# Patient Record
Sex: FEMALE | Race: WHITE | NOT HISPANIC OR LATINO | Employment: OTHER | URBAN - METROPOLITAN AREA
[De-identification: names, ages, dates, MRNs, and addresses within clinical notes are randomized per-mention and may not be internally consistent; named-entity substitution may affect disease eponyms.]

---

## 2023-03-16 PROBLEM — I65.23 BILATERAL CAROTID ARTERY STENOSIS: Status: ACTIVE | Noted: 2023-03-16

## 2023-03-16 PROBLEM — S42.209D: Status: ACTIVE | Noted: 2023-03-16

## 2023-03-16 PROBLEM — Z85.72 HISTORY OF LYMPHOMA: Status: ACTIVE | Noted: 2023-03-16

## 2023-03-16 PROBLEM — Z85.3 HISTORY OF BREAST CANCER: Status: ACTIVE | Noted: 2023-03-16

## 2023-03-16 PROBLEM — Z85.850 HISTORY OF THYROID CANCER: Status: ACTIVE | Noted: 2023-03-16
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
## 2023-03-16 PROBLEM — F32.1 DEPRESSION, MAJOR, SINGLE EPISODE, MODERATE (MULTI): Status: ACTIVE | Noted: 2023-03-16

## 2023-03-16 PROBLEM — I73.9 PERIPHERAL ARTERIAL DISEASE (CMS-HCC): Status: ACTIVE | Noted: 2023-03-16

## 2023-03-16 PROBLEM — K92.2 GI BLEEDING: Status: ACTIVE | Noted: 2023-03-16

## 2023-03-16 PROBLEM — N18.30 CKD (CHRONIC KIDNEY DISEASE) STAGE 3, GFR 30-59 ML/MIN (MULTI): Status: ACTIVE | Noted: 2023-03-16

## 2023-03-16 PROBLEM — C44.301 SKIN CANCER OF NOSE: Status: ACTIVE | Noted: 2023-03-16

## 2023-03-16 PROBLEM — R60.0 EDEMA LEG: Status: ACTIVE | Noted: 2023-03-16

## 2023-03-16 PROBLEM — C78.00: Status: RESOLVED | Noted: 2023-03-16 | Resolved: 2023-03-16

## 2023-03-16 PROBLEM — H61.20 CERUMEN IMPACTION: Status: ACTIVE | Noted: 2023-03-16

## 2023-03-16 PROBLEM — S09.90XA CLOSED HEAD INJURY: Status: ACTIVE | Noted: 2023-03-16

## 2023-03-16 PROBLEM — C78.00: Status: ACTIVE | Noted: 2023-03-16

## 2023-03-16 PROBLEM — C82.93: Status: RESOLVED | Noted: 2023-03-16 | Resolved: 2023-03-16

## 2023-03-16 PROBLEM — I50.32 CHRONIC DIASTOLIC HEART FAILURE (MULTI): Status: ACTIVE | Noted: 2023-03-16

## 2023-03-16 PROBLEM — R22.2 ABDOMINAL WALL MASS: Status: ACTIVE | Noted: 2023-03-16

## 2023-03-16 PROBLEM — C73 MALIGNANT TUMOR OF THYROID GLAND (MULTI): Status: RESOLVED | Noted: 2023-03-16 | Resolved: 2023-03-16

## 2023-03-16 PROBLEM — K25.9 GASTRIC ULCER: Status: ACTIVE | Noted: 2023-03-16

## 2023-03-16 PROBLEM — E11.42 POLYNEUROPATHY DUE TO TYPE 2 DIABETES MELLITUS (MULTI): Status: RESOLVED | Noted: 2023-03-16 | Resolved: 2023-03-16

## 2023-03-16 PROBLEM — L29.9 PRURITUS: Status: ACTIVE | Noted: 2023-03-16

## 2023-03-16 PROBLEM — F41.1 ANXIETY STATE: Status: ACTIVE | Noted: 2018-01-01

## 2023-03-16 PROBLEM — C82.93: Status: ACTIVE | Noted: 2023-03-16

## 2023-03-16 PROBLEM — F41.1 ANXIETY STATE: Status: RESOLVED | Noted: 2018-01-01 | Resolved: 2023-03-16

## 2023-03-16 PROBLEM — M25.612 DECREASED RANGE OF MOTION OF LEFT SHOULDER: Status: ACTIVE | Noted: 2023-03-16

## 2023-03-16 PROBLEM — C73 MALIGNANT TUMOR OF THYROID GLAND (MULTI): Status: ACTIVE | Noted: 2023-03-16

## 2023-03-16 PROBLEM — E11.42 POLYNEUROPATHY DUE TO TYPE 2 DIABETES MELLITUS (MULTI): Status: ACTIVE | Noted: 2023-03-16

## 2023-03-16 PROBLEM — K65.9 PERITONITIS (MULTI): Status: ACTIVE | Noted: 2023-03-16

## 2023-03-16 PROBLEM — U07.1 COVID-19 VIRUS INFECTION: Status: ACTIVE | Noted: 2023-03-16

## 2023-03-16 PROBLEM — I50.32 CHRONIC DIASTOLIC HEART FAILURE (MULTI): Status: RESOLVED | Noted: 2023-03-16 | Resolved: 2023-03-16

## 2023-03-16 RX ORDER — WARFARIN 4 MG/1
TABLET ORAL
COMMUNITY
Start: 2022-07-09

## 2023-03-16 RX ORDER — METOPROLOL TARTRATE 50 MG/1
1 TABLET ORAL 2 TIMES DAILY
COMMUNITY
Start: 2021-01-21

## 2023-03-16 RX ORDER — ALBUTEROL SULFATE 90 UG/1
AEROSOL, METERED RESPIRATORY (INHALATION)
COMMUNITY
Start: 2022-07-29 | End: 2023-06-27 | Stop reason: ALTCHOICE

## 2023-03-16 RX ORDER — PANTOPRAZOLE SODIUM 40 MG/1
1 TABLET, DELAYED RELEASE ORAL DAILY
COMMUNITY
Start: 2021-01-21

## 2023-03-16 RX ORDER — NIACIN (INOSITOL NIACINATE) 400(500MG)
1 CAPSULE ORAL 2 TIMES DAILY
COMMUNITY
Start: 2021-01-21 | End: 2023-06-27 | Stop reason: ALTCHOICE

## 2023-03-16 RX ORDER — ANASTROZOLE 1 MG/1
TABLET ORAL
COMMUNITY

## 2023-03-16 RX ORDER — WARFARIN 2 MG/1
TABLET ORAL
COMMUNITY

## 2023-03-16 RX ORDER — LEVOTHYROXINE SODIUM 100 UG/1
TABLET ORAL
COMMUNITY
Start: 2022-08-19

## 2023-03-16 RX ORDER — POTASSIUM CHLORIDE 1500 MG/1
1 TABLET, EXTENDED RELEASE ORAL 2 TIMES DAILY
COMMUNITY
Start: 2021-01-21

## 2023-03-16 RX ORDER — AMITRIPTYLINE HYDROCHLORIDE 10 MG/1
1 TABLET, FILM COATED ORAL NIGHTLY
COMMUNITY
Start: 2022-08-29

## 2023-03-16 RX ORDER — ACETAMINOPHEN, DEXTROMETHORPHAN HBR, DOXYLAMINE SUCCINATE, PHENYLEPHRINE HCL 650; 20; 12.5; 1 MG/30ML; MG/30ML; MG/30ML; MG/30ML
SOLUTION ORAL
COMMUNITY
Start: 2021-01-21

## 2023-03-21 ENCOUNTER — OFFICE VISIT (OUTPATIENT)
Dept: PRIMARY CARE | Facility: CLINIC | Age: 88
End: 2023-03-21
Payer: MEDICARE

## 2023-03-21 VITALS
BODY MASS INDEX: 23.92 KG/M2 | WEIGHT: 130 LBS | OXYGEN SATURATION: 94 % | DIASTOLIC BLOOD PRESSURE: 90 MMHG | HEIGHT: 62 IN | HEART RATE: 82 BPM | RESPIRATION RATE: 18 BRPM | TEMPERATURE: 98.1 F | SYSTOLIC BLOOD PRESSURE: 150 MMHG

## 2023-03-21 DIAGNOSIS — H61.21 IMPACTED CERUMEN OF RIGHT EAR: Primary | ICD-10-CM

## 2023-03-21 PROCEDURE — 99213 OFFICE O/P EST LOW 20 MIN: CPT | Performed by: INTERNAL MEDICINE

## 2023-03-21 PROCEDURE — 1159F MED LIST DOCD IN RCRD: CPT | Performed by: INTERNAL MEDICINE

## 2023-03-21 PROCEDURE — 1036F TOBACCO NON-USER: CPT | Performed by: INTERNAL MEDICINE

## 2023-03-21 RX ORDER — TELMISARTAN 80 MG/1
80 TABLET ORAL DAILY
COMMUNITY

## 2023-03-21 RX ORDER — MIRTAZAPINE 15 MG/1
15 TABLET, FILM COATED ORAL NIGHTLY
COMMUNITY

## 2023-03-21 ASSESSMENT — PAIN SCALES - GENERAL: PAINLEVEL: 0-NO PAIN

## 2023-03-21 NOTE — PROGRESS NOTES
"The patient is here for:   Chief Complaint   Patient presents with    ears cleaned        I have reviewed existing histories, notes, past medical history, surgical history, social history, family history, med list, allergy list, and immunization, and updated if applicable.    Patient's PCP is: Iain Cooley MD    HPI:   Ear doctor told her to get ears cleaned before they fit her for new hearing aid.  No pain in ears.    Additional concerns and ROS:  none    Physical exam:  /90   Pulse 82   Temp 36.7 °C (98.1 °F)   Resp 18   Ht 1.575 m (5' 2\")   Wt 59 kg (130 lb)   SpO2 94%   BMI 23.78 kg/m²    Small amount of cerumen, removed with looping.  Pt tolerated well.    Assessments/orders:   1. Impacted cerumen of right ear         Plan/discussion and follow up:      Follow up prn           "

## 2023-06-26 RX ORDER — ACETAMINOPHEN 500 MG
TABLET ORAL
COMMUNITY

## 2023-06-27 ENCOUNTER — OFFICE VISIT (OUTPATIENT)
Dept: PRIMARY CARE | Facility: CLINIC | Age: 88
End: 2023-06-27
Payer: MEDICARE

## 2023-06-27 VITALS
HEART RATE: 85 BPM | HEIGHT: 62 IN | DIASTOLIC BLOOD PRESSURE: 82 MMHG | TEMPERATURE: 97.8 F | SYSTOLIC BLOOD PRESSURE: 131 MMHG | WEIGHT: 130 LBS | BODY MASS INDEX: 23.92 KG/M2 | OXYGEN SATURATION: 91 %

## 2023-06-27 DIAGNOSIS — Z00.00 MEDICARE ANNUAL WELLNESS VISIT, SUBSEQUENT: Primary | ICD-10-CM

## 2023-06-27 DIAGNOSIS — R10.9 ABDOMINAL PAIN, UNSPECIFIED ABDOMINAL LOCATION: ICD-10-CM

## 2023-06-27 LAB
POC APPEARANCE, URINE: CLEAR
POC BILIRUBIN, URINE: NEGATIVE
POC BLOOD, URINE: NEGATIVE
POC COLOR, URINE: YELLOW
POC GLUCOSE, URINE: NEGATIVE MG/DL
POC KETONES, URINE: NEGATIVE MG/DL
POC LEUKOCYTES, URINE: NEGATIVE
POC NITRITE,URINE: NEGATIVE
POC PH, URINE: 6 PH
POC PROTEIN, URINE: NEGATIVE MG/DL
POC SPECIFIC GRAVITY, URINE: >=1.03
POC UROBILINOGEN, URINE: 0.2 EU/DL

## 2023-06-27 PROCEDURE — 1036F TOBACCO NON-USER: CPT | Performed by: INTERNAL MEDICINE

## 2023-06-27 PROCEDURE — 1170F FXNL STATUS ASSESSED: CPT | Performed by: INTERNAL MEDICINE

## 2023-06-27 PROCEDURE — 81003 URINALYSIS AUTO W/O SCOPE: CPT | Performed by: INTERNAL MEDICINE

## 2023-06-27 PROCEDURE — 1159F MED LIST DOCD IN RCRD: CPT | Performed by: INTERNAL MEDICINE

## 2023-06-27 PROCEDURE — G0439 PPPS, SUBSEQ VISIT: HCPCS | Performed by: INTERNAL MEDICINE

## 2023-06-27 PROCEDURE — 99213 OFFICE O/P EST LOW 20 MIN: CPT | Performed by: INTERNAL MEDICINE

## 2023-06-27 RX ORDER — ONDANSETRON 4 MG/1
TABLET, ORALLY DISINTEGRATING ORAL
COMMUNITY
Start: 2022-07-29

## 2023-06-27 ASSESSMENT — ACTIVITIES OF DAILY LIVING (ADL)
DRESSING: INDEPENDENT
BATHING: INDEPENDENT
MANAGING_FINANCES: INDEPENDENT
GROCERY_SHOPPING: INDEPENDENT
TAKING_MEDICATION: INDEPENDENT
DOING_HOUSEWORK: INDEPENDENT

## 2023-06-27 ASSESSMENT — PATIENT HEALTH QUESTIONNAIRE - PHQ9
2. FEELING DOWN, DEPRESSED OR HOPELESS: NOT AT ALL
1. LITTLE INTEREST OR PLEASURE IN DOING THINGS: NOT AT ALL
SUM OF ALL RESPONSES TO PHQ9 QUESTIONS 1 AND 2: 0

## 2023-06-28 NOTE — PROGRESS NOTES
SUBJECTIVE:   Lyn Turner is a 89 y.o. female presenting for her annual physical/wellness.  PCP: Iain Cooley MD  Current Outpatient Medications on File Prior to Visit   Medication Sig Dispense Refill    ondansetron ODT (Zofran-ODT) 4 mg disintegrating tablet       acetaminophen (Tylenol) 500 mg tablet Take by mouth.      amitriptyline (Elavil) 10 mg tablet Take 1 tablet (10 mg) by mouth once daily at bedtime.      anastrozole (Arimidex) 1 mg tablet Take by mouth.      coenzyme Q-10 300 mg capsule capsule once daily.      cyanocobalamin, vitamin B-12, (Vitamin B-12) 1,000 mcg tablet extended release Take by mouth.      levothyroxine (Synthroid, Levoxyl) 100 mcg tablet Take by mouth.      metoprolol tartrate (Lopressor) 50 mg tablet Take 1 tablet (50 mg) by mouth in the morning and 1 tablet (50 mg) before bedtime.      mirtazapine (Remeron) 15 mg tablet Take 1 tablet (15 mg) by mouth once daily at bedtime.      pantoprazole (ProtoNix) 40 mg EC tablet Take 1 tablet (40 mg) by mouth once daily.      potassium chloride CR (K-Tab) 20 mEq ER tablet Take 1 tablet (20 mEq) by mouth in the morning and 1 tablet (20 mEq) before bedtime.      telmisartan (MIcarDIS) 80 mg tablet Take 1 tablet (80 mg) by mouth once daily.      warfarin (Coumadin) 2 mg tablet Take by mouth.      warfarin (Coumadin) 4 mg tablet Take by mouth.      [DISCONTINUED] albuterol 90 mcg/actuation inhaler Inhale.      [DISCONTINUED] coenzyme Q10-vitamin E 100-5 mg-unit capsule Take 1 capsule by mouth in the morning and 1 capsule before bedtime.       No current facility-administered medications on file prior to visit.     Pt also complains of feeling of indigestion after eating, it is located in lower mid abdomen, a cramp like feeling. Occurs after eating, and also some time it wakes her up middle of night. She has lot of stress lately, and she thinks likely related to high stress because she is doing somewhat better in the past couple of days after  "stress was reduced somewhat.  Hx of peptic ulcer bleeding.  She denied epigastric pain, denied black stool or rectal bleeding.  Some urinary frequency and urgency, no dysuria.  No Fever and chills or back pain  No dizziness.  She had ct scan of abdomen for Abdominal wall masses last fall. Ct showed cyst or hematoma. Pt said these have not changed, and no pain.    Colon screening: na  Last pap: na  Last mammogram: na  Dexa na  Vaccines Up to date.    Diet:   healthy in general  Exercises:  somewhat  regularly  Lab Results   Component Value Date    HGBA1C 6.4 (H) 01/27/2021     Lab Results   Component Value Date    CREATININE 1.49 (H) 11/23/2022     Lab Results   Component Value Date    WBC 13.1 (H) 10/05/2020    HGB 9.3 (L) 10/05/2020    HCT 29.0 (L) 10/05/2020    MCV 94 10/05/2020     10/05/2020     No results found for: \"CHOL\"  No results found for: \"HDL\"  No results found for: \"LDLCALC\"  No results found for: \"TRIG\"  No components found for: \"CHOLHDL\"       ROS:  in general Feeling well. Stressful due to children.  No dyspnea or chest pain on exertion. No abdominal pain, change in bowel habits, black or bloody stools. No urinary tract or  symptoms.  No breast concerns. No neurological complaints.    OBJECTIVE:   The patient appears well, alert, oriented x 3, in no distress.   /82   Pulse 85   Temp 36.6 °C (97.8 °F)   Ht 1.562 m (5' 1.5\")   Wt 59 kg (130 lb)   SpO2 91%   BMI 24.17 kg/m²   ENT normal.  Neck supple. No adenopathy or thyromegaly. SHAYAN. Lungs are clear, good air entry, no wheezes, rhonchi or rales. S1 and S2 normal, no murmurs, regular rate and rhythm. Abdomen is soft without tenderness, guarding. Cyst near umbilicus stays the same as before per pt, non tender. Extremities show no edema, normal peripheral pulses. Neurological is normal without focal findings.    A/P:  1. Medicare annual wellness visit, subsequent       2. Abdominal pain, unspecified abdominal location  Is getting " better, with now lower stress level. Will observe for a couple of weeks. May need further work up (such as Ct abdomen) if pain continues or gets worse. Pt will call. Pt declined to consider lab testing today. She said if pain gets worse she would get labs.  She has a med port on right side. Arm veins are difficult to get blood per pt.   - POCT UA Automated manually resulted. Urine is negative

## 2023-12-07 ENCOUNTER — APPOINTMENT (OUTPATIENT)
Dept: RADIOLOGY | Facility: HOSPITAL | Age: 88
DRG: 480 | End: 2023-12-07
Payer: MEDICARE

## 2023-12-07 ENCOUNTER — HOSPITAL ENCOUNTER (INPATIENT)
Facility: HOSPITAL | Age: 88
LOS: 5 days | Discharge: HOSPICE | DRG: 480 | End: 2023-12-13
Attending: EMERGENCY MEDICINE | Admitting: HOSPITALIST
Payer: MEDICARE

## 2023-12-07 DIAGNOSIS — E03.9 HYPOTHYROIDISM, UNSPECIFIED TYPE: ICD-10-CM
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
## 2023-12-07 DIAGNOSIS — F09 COGNITIVE DISORDER: ICD-10-CM

## 2023-12-07 DIAGNOSIS — Z92.29 HISTORY OF ANTICOAGULANT THERAPY: ICD-10-CM

## 2023-12-07 DIAGNOSIS — I48.91 ATRIAL FIBRILLATION, UNSPECIFIED TYPE (MULTI): ICD-10-CM

## 2023-12-07 DIAGNOSIS — N17.9 AKI (ACUTE KIDNEY INJURY) (CMS-HCC): ICD-10-CM

## 2023-12-07 DIAGNOSIS — I10 PRIMARY HYPERTENSION: ICD-10-CM

## 2023-12-07 DIAGNOSIS — S72.002A CLOSED FRACTURE OF LEFT HIP, INITIAL ENCOUNTER (MULTI): Primary | ICD-10-CM

## 2023-12-07 LAB
ABO GROUP (TYPE) IN BLOOD: NORMAL
ALBUMIN SERPL BCP-MCNC: 4 G/DL (ref 3.4–5)
ALP SERPL-CCNC: 55 U/L (ref 33–136)
ALT SERPL W P-5'-P-CCNC: 8 U/L (ref 7–45)
ANION GAP SERPL CALC-SCNC: 11 MMOL/L (ref 10–20)
ANTIBODY SCREEN: NORMAL
AST SERPL W P-5'-P-CCNC: 14 U/L (ref 9–39)
BASOPHILS # BLD AUTO: 0.02 X10*3/UL (ref 0–0.1)
BASOPHILS NFR BLD AUTO: 0.2 %
BILIRUB SERPL-MCNC: 0.8 MG/DL (ref 0–1.2)
BUN SERPL-MCNC: 24 MG/DL (ref 6–23)
CALCIUM SERPL-MCNC: 8.8 MG/DL (ref 8.6–10.3)
CHLORIDE SERPL-SCNC: 104 MMOL/L (ref 98–107)
CO2 SERPL-SCNC: 27 MMOL/L (ref 21–32)
CREAT SERPL-MCNC: 1.35 MG/DL (ref 0.5–1.05)
EOSINOPHIL # BLD AUTO: 0.35 X10*3/UL (ref 0–0.4)
EOSINOPHIL NFR BLD AUTO: 3.5 %
ERYTHROCYTE [DISTWIDTH] IN BLOOD BY AUTOMATED COUNT: 16 % (ref 11.5–14.5)
GFR SERPL CREATININE-BSD FRML MDRD: 37 ML/MIN/1.73M*2
GLUCOSE SERPL-MCNC: 133 MG/DL (ref 74–99)
HCT VFR BLD AUTO: 34.1 % (ref 36–46)
HGB BLD-MCNC: 11.1 G/DL (ref 12–16)
IMM GRANULOCYTES # BLD AUTO: 0.14 X10*3/UL (ref 0–0.5)
IMM GRANULOCYTES NFR BLD AUTO: 1.4 % (ref 0–0.9)
INR PPP: 2.2 (ref 0.9–1.1)
LYMPHOCYTES # BLD AUTO: 1.97 X10*3/UL (ref 0.8–3)
LYMPHOCYTES NFR BLD AUTO: 19.4 %
MCH RBC QN AUTO: 29.5 PG (ref 26–34)
MCHC RBC AUTO-ENTMCNC: 32.6 G/DL (ref 32–36)
MCV RBC AUTO: 91 FL (ref 80–100)
MONOCYTES # BLD AUTO: 0.47 X10*3/UL (ref 0.05–0.8)
MONOCYTES NFR BLD AUTO: 4.6 %
NEUTROPHILS # BLD AUTO: 7.19 X10*3/UL (ref 1.6–5.5)
NEUTROPHILS NFR BLD AUTO: 70.9 %
NRBC BLD-RTO: 0 /100 WBCS (ref 0–0)
PLATELET # BLD AUTO: 165 X10*3/UL (ref 150–450)
POTASSIUM SERPL-SCNC: 4.2 MMOL/L (ref 3.5–5.3)
PROT SERPL-MCNC: 6.1 G/DL (ref 6.4–8.2)
PROTHROMBIN TIME: 25.5 SECONDS (ref 9.8–12.8)
RBC # BLD AUTO: 3.76 X10*6/UL (ref 4–5.2)
RH FACTOR (ANTIGEN D): NORMAL
SODIUM SERPL-SCNC: 138 MMOL/L (ref 136–145)
WBC # BLD AUTO: 10.1 X10*3/UL (ref 4.4–11.3)

## 2023-12-07 PROCEDURE — 99223 1ST HOSP IP/OBS HIGH 75: CPT | Performed by: INTERNAL MEDICINE

## 2023-12-07 PROCEDURE — 72131 CT LUMBAR SPINE W/O DYE: CPT | Performed by: RADIOLOGY

## 2023-12-07 PROCEDURE — 73110 X-RAY EXAM OF WRIST: CPT | Mod: LT

## 2023-12-07 PROCEDURE — 2500000004 HC RX 250 GENERAL PHARMACY W/ HCPCS (ALT 636 FOR OP/ED): Performed by: INTERNAL MEDICINE

## 2023-12-07 PROCEDURE — 72131 CT LUMBAR SPINE W/O DYE: CPT

## 2023-12-07 PROCEDURE — 80053 COMPREHEN METABOLIC PANEL: CPT | Performed by: EMERGENCY MEDICINE

## 2023-12-07 PROCEDURE — 85610 PROTHROMBIN TIME: CPT | Performed by: EMERGENCY MEDICINE

## 2023-12-07 PROCEDURE — 85025 COMPLETE CBC W/AUTO DIFF WBC: CPT | Performed by: EMERGENCY MEDICINE

## 2023-12-07 PROCEDURE — 96375 TX/PRO/DX INJ NEW DRUG ADDON: CPT

## 2023-12-07 PROCEDURE — 73110 X-RAY EXAM OF WRIST: CPT | Mod: LEFT SIDE | Performed by: RADIOLOGY

## 2023-12-07 PROCEDURE — 99285 EMERGENCY DEPT VISIT HI MDM: CPT | Performed by: EMERGENCY MEDICINE

## 2023-12-07 PROCEDURE — 2W3DX1Z IMMOBILIZATION OF LEFT LOWER ARM USING SPLINT: ICD-10-PCS | Performed by: EMERGENCY MEDICINE

## 2023-12-07 PROCEDURE — 2500000004 HC RX 250 GENERAL PHARMACY W/ HCPCS (ALT 636 FOR OP/ED): Performed by: EMERGENCY MEDICINE

## 2023-12-07 PROCEDURE — 96361 HYDRATE IV INFUSION ADD-ON: CPT

## 2023-12-07 PROCEDURE — 2500000001 HC RX 250 WO HCPCS SELF ADMINISTERED DRUGS (ALT 637 FOR MEDICARE OP): Performed by: INTERNAL MEDICINE

## 2023-12-07 PROCEDURE — 73502 X-RAY EXAM HIP UNI 2-3 VIEWS: CPT | Mod: LT

## 2023-12-07 PROCEDURE — 86901 BLOOD TYPING SEROLOGIC RH(D): CPT | Performed by: EMERGENCY MEDICINE

## 2023-12-07 PROCEDURE — 73090 X-RAY EXAM OF FOREARM: CPT | Mod: LT

## 2023-12-07 PROCEDURE — 96365 THER/PROPH/DIAG IV INF INIT: CPT

## 2023-12-07 PROCEDURE — 73502 X-RAY EXAM HIP UNI 2-3 VIEWS: CPT | Mod: LEFT SIDE | Performed by: RADIOLOGY

## 2023-12-07 PROCEDURE — 96376 TX/PRO/DX INJ SAME DRUG ADON: CPT

## 2023-12-07 PROCEDURE — 73090 X-RAY EXAM OF FOREARM: CPT | Mod: LEFT SIDE | Performed by: RADIOLOGY

## 2023-12-07 RX ORDER — VALSARTAN 40 MG/1
160 TABLET ORAL DAILY
Status: DISCONTINUED | OUTPATIENT
Start: 2023-12-08 | End: 2023-12-11

## 2023-12-07 RX ORDER — LEVOTHYROXINE SODIUM 100 UG/1
100 TABLET ORAL DAILY
Status: DISCONTINUED | OUTPATIENT
Start: 2023-12-08 | End: 2023-12-11

## 2023-12-07 RX ORDER — MORPHINE SULFATE 2 MG/ML
2 INJECTION, SOLUTION INTRAMUSCULAR; INTRAVENOUS ONCE
Status: COMPLETED | OUTPATIENT
Start: 2023-12-07 | End: 2023-12-07

## 2023-12-07 RX ORDER — ONDANSETRON HYDROCHLORIDE 2 MG/ML
4 INJECTION, SOLUTION INTRAVENOUS ONCE
Status: COMPLETED | OUTPATIENT
Start: 2023-12-07 | End: 2023-12-07

## 2023-12-07 RX ORDER — ONDANSETRON HYDROCHLORIDE 2 MG/ML
4 INJECTION, SOLUTION INTRAVENOUS EVERY 6 HOURS PRN
Status: DISCONTINUED | OUTPATIENT
Start: 2023-12-07 | End: 2023-12-11

## 2023-12-07 RX ORDER — ACETAMINOPHEN 325 MG/1
650 TABLET ORAL EVERY 4 HOURS PRN
Status: DISCONTINUED | OUTPATIENT
Start: 2023-12-07 | End: 2023-12-11

## 2023-12-07 RX ORDER — MORPHINE SULFATE 2 MG/ML
2 INJECTION, SOLUTION INTRAMUSCULAR; INTRAVENOUS EVERY 4 HOURS PRN
Status: DISCONTINUED | OUTPATIENT
Start: 2023-12-07 | End: 2023-12-11

## 2023-12-07 RX ORDER — METOPROLOL TARTRATE 50 MG/1
50 TABLET ORAL 2 TIMES DAILY
Status: DISCONTINUED | OUTPATIENT
Start: 2023-12-07 | End: 2023-12-11

## 2023-12-07 RX ORDER — AMITRIPTYLINE HYDROCHLORIDE 10 MG/1
10 TABLET, FILM COATED ORAL NIGHTLY
Status: DISCONTINUED | OUTPATIENT
Start: 2023-12-07 | End: 2023-12-11

## 2023-12-07 RX ORDER — MORPHINE SULFATE 2 MG/ML
1 INJECTION, SOLUTION INTRAMUSCULAR; INTRAVENOUS EVERY 4 HOURS PRN
Status: DISCONTINUED | OUTPATIENT
Start: 2023-12-07 | End: 2023-12-11

## 2023-12-07 RX ORDER — SODIUM CHLORIDE, SODIUM LACTATE, POTASSIUM CHLORIDE, CALCIUM CHLORIDE 600; 310; 30; 20 MG/100ML; MG/100ML; MG/100ML; MG/100ML
75 INJECTION, SOLUTION INTRAVENOUS CONTINUOUS
Status: ACTIVE | OUTPATIENT
Start: 2023-12-07 | End: 2023-12-08

## 2023-12-07 RX ORDER — PANTOPRAZOLE SODIUM 40 MG/1
40 TABLET, DELAYED RELEASE ORAL DAILY
Status: DISCONTINUED | OUTPATIENT
Start: 2023-12-07 | End: 2023-12-11

## 2023-12-07 RX ADMIN — MORPHINE SULFATE 2 MG: 2 INJECTION, SOLUTION INTRAMUSCULAR; INTRAVENOUS at 20:53

## 2023-12-07 RX ADMIN — MORPHINE SULFATE 2 MG: 2 INJECTION, SOLUTION INTRAMUSCULAR; INTRAVENOUS at 16:53

## 2023-12-07 RX ADMIN — SODIUM CHLORIDE, POTASSIUM CHLORIDE, SODIUM LACTATE AND CALCIUM CHLORIDE 75 ML/HR: 600; 310; 30; 20 INJECTION, SOLUTION INTRAVENOUS at 20:49

## 2023-12-07 RX ADMIN — ONDANSETRON 4 MG: 2 INJECTION INTRAMUSCULAR; INTRAVENOUS at 16:51

## 2023-12-07 RX ADMIN — AMITRIPTYLINE HYDROCHLORIDE 10 MG: 10 TABLET, FILM COATED ORAL at 20:47

## 2023-12-07 RX ADMIN — METOPROLOL TARTRATE 50 MG: 50 TABLET, FILM COATED ORAL at 20:47

## 2023-12-07 RX ADMIN — PANTOPRAZOLE SODIUM 40 MG: 40 TABLET, DELAYED RELEASE ORAL at 20:47

## 2023-12-07 SDOH — SOCIAL STABILITY: SOCIAL INSECURITY: ABUSE: ADULT

## 2023-12-07 SDOH — SOCIAL STABILITY: SOCIAL INSECURITY: WERE YOU ABLE TO COMPLETE ALL THE BEHAVIORAL HEALTH SCREENINGS?: YES

## 2023-12-07 SDOH — SOCIAL STABILITY: SOCIAL INSECURITY: HAVE YOU HAD THOUGHTS OF HARMING ANYONE ELSE?: NO

## 2023-12-07 SDOH — SOCIAL STABILITY: SOCIAL INSECURITY: HAS ANYONE EVER THREATENED TO HURT YOUR FAMILY OR YOUR PETS?: NO

## 2023-12-07 SDOH — SOCIAL STABILITY: SOCIAL INSECURITY: ARE THERE ANY APPARENT SIGNS OF INJURIES/BEHAVIORS THAT COULD BE RELATED TO ABUSE/NEGLECT?: NO

## 2023-12-07 SDOH — SOCIAL STABILITY: SOCIAL INSECURITY: ARE YOU OR HAVE YOU BEEN THREATENED OR ABUSED PHYSICALLY, EMOTIONALLY, OR SEXUALLY BY ANYONE?: NO

## 2023-12-07 SDOH — SOCIAL STABILITY: SOCIAL INSECURITY: DO YOU FEEL ANYONE HAS EXPLOITED OR TAKEN ADVANTAGE OF YOU FINANCIALLY OR OF YOUR PERSONAL PROPERTY?: NO

## 2023-12-07 SDOH — SOCIAL STABILITY: SOCIAL INSECURITY: DO YOU FEEL UNSAFE GOING BACK TO THE PLACE WHERE YOU ARE LIVING?: NO

## 2023-12-07 SDOH — SOCIAL STABILITY: SOCIAL INSECURITY: DOES ANYONE TRY TO KEEP YOU FROM HAVING/CONTACTING OTHER FRIENDS OR DOING THINGS OUTSIDE YOUR HOME?: NO

## 2023-12-07 ASSESSMENT — PATIENT HEALTH QUESTIONNAIRE - PHQ9
SUM OF ALL RESPONSES TO PHQ9 QUESTIONS 1 & 2: 0
1. LITTLE INTEREST OR PLEASURE IN DOING THINGS: NOT AT ALL
2. FEELING DOWN, DEPRESSED OR HOPELESS: NOT AT ALL

## 2023-12-07 ASSESSMENT — COLUMBIA-SUICIDE SEVERITY RATING SCALE - C-SSRS
2. HAVE YOU ACTUALLY HAD ANY THOUGHTS OF KILLING YOURSELF?: NO
6. HAVE YOU EVER DONE ANYTHING, STARTED TO DO ANYTHING, OR PREPARED TO DO ANYTHING TO END YOUR LIFE?: NO
1. IN THE PAST MONTH, HAVE YOU WISHED YOU WERE DEAD OR WISHED YOU COULD GO TO SLEEP AND NOT WAKE UP?: NO

## 2023-12-07 ASSESSMENT — LIFESTYLE VARIABLES
HOW OFTEN DO YOU HAVE A DRINK CONTAINING ALCOHOL: NEVER
SUBSTANCE_ABUSE_PAST_12_MONTHS: NO
HOW MANY STANDARD DRINKS CONTAINING ALCOHOL DO YOU HAVE ON A TYPICAL DAY: PATIENT DOES NOT DRINK
PRESCIPTION_ABUSE_PAST_12_MONTHS: NO
AUDIT-C TOTAL SCORE: 0
SKIP TO QUESTIONS 9-10: 1
HOW OFTEN DO YOU HAVE 6 OR MORE DRINKS ON ONE OCCASION: NEVER
AUDIT-C TOTAL SCORE: 0

## 2023-12-07 ASSESSMENT — COGNITIVE AND FUNCTIONAL STATUS - GENERAL
MOVING FROM LYING ON BACK TO SITTING ON SIDE OF FLAT BED WITH BEDRAILS: A LOT
DRESSING REGULAR LOWER BODY CLOTHING: TOTAL
HELP NEEDED FOR BATHING: A LOT
MOBILITY SCORE: 7
DRESSING REGULAR UPPER BODY CLOTHING: A LOT
MOVING TO AND FROM BED TO CHAIR: TOTAL
TOILETING: A LOT
PATIENT BASELINE BEDBOUND: NO
CLIMB 3 TO 5 STEPS WITH RAILING: TOTAL
STANDING UP FROM CHAIR USING ARMS: TOTAL
TURNING FROM BACK TO SIDE WHILE IN FLAT BAD: TOTAL
DAILY ACTIVITIY SCORE: 15
WALKING IN HOSPITAL ROOM: TOTAL

## 2023-12-07 ASSESSMENT — ACTIVITIES OF DAILY LIVING (ADL): LACK_OF_TRANSPORTATION: NO

## 2023-12-07 ASSESSMENT — ENCOUNTER SYMPTOMS
BACK PAIN: 1
JOINT SWELLING: 1
NECK STIFFNESS: 1

## 2023-12-07 ASSESSMENT — PAIN - FUNCTIONAL ASSESSMENT: PAIN_FUNCTIONAL_ASSESSMENT: 0-10

## 2023-12-07 NOTE — ED PROVIDER NOTES
HPI   Chief Complaint   Patient presents with    Fall     Mechanical fall from small landing fell onto l wrist complaint of l hip pain and l wrist pain on coumadin but did not hit head        HPI     HISTORY OF PRESENT ILLNESS:  Patient is a 89-year-old female with history of A-fib on Coumadin who presents to the emergency department status post fall.  Patient was brought in by EMS.  Patient states that she was showing a gas something in the house and did not realize she was close to the step.  She accidentally tripped over 4 inch step, causing her to fall onto her left side.  She did reach out with her left hand to try to catch herself.  After the fall, she had left wrist and left hip pain.  The patient denies striking her head.  She typically takes Tylenol for pain.  Patient denies any presyncopal symptoms    Past Medical History: Heart failure with preserved ejection fraction, chronic atrial fibrillation, hypertension hyperlipidemia, pacemaker  Past Surgical History: Bilateral wrist surgery, pacemaker      __________________________________________________________  PHYSICAL EXAM:    Appearance:  female, appears stated age, supine on bed alert, oriented , cooperative   Skin: Intact,  dry skin, no lesions, rash, petechiae or purpura.   Eyes: PERRLA, EOMs intact,  Conjunctiva pink with no redness or exudates.    HENT: Normocephalic, atraumatic. Nares patent   Neck: Supple. Trachea at midline.   Pulmonary: Lung sounds are clear bilaterally.  There is no rales, rhonchi, or wheezing.  Cardiac: Regular rate and rhythm, no rubs, murmurs, or gallops. No JVD,   Abdomen: Abdomen is soft, nontender, and nondistended.  No palpable organomegaly.  No rebound or guarding.  No CVA tenderness. Nonsurgical abdomen  Genitourinary: Exam deferred.  Musculoskeletal: Had point tenderness with the left wrist.  No snuffbox tenderness.  Able to make a thumbs up and fist.  The patient also had midline L4 tenderness without step-off  or deformity.  Otherwise, no CT or L-spine tenderness.  Pelvis stable to rocking though, there is tenderness with the left hip.  Neurological:  Cranial nerves are grossly intact, grossly normal sensation, no weakness, no focal findings identified.    __________________________________________________________  MEDICAL DECISION MAKING:    Patient was seen and examined.  Patient suffered a mechanical fall.  She states that she had excellently tripped on a step and fell onto her outstretched left hand in addition to her left hip.  She denies hitting her head.  My exam did reveal that she also had some tenderness in her lower lumbar region.  I obtained x-rays in addition to CT lumbar.  Apparently reviewed the x-ray noted for toe fracture as noted in ED course.  I contacted orthopedics.  I also noted that there is a very subtle possible fracture with the forearm.  Radiology read read as a possible buckle fracture with intact hardware for the forearm in addition to the intertrochanteric fracture.  This was relayed to on-call orthopedic surgery, Dr. Marte.  He recommended admission and Velcro splint for the forearm.  Of note, patient had been given 2 mg of morphine and then had mildly desaturated currently on 1 L nasal cannula oxygen.  Patient and patient's family member informed of the diagnosis.  I offered transfer and declined.  Case discussed with [facility] who agreed that the patient admitted here for further management of hip fracture.    Chronic Medical Conditions Significantly Affecting Care: Atrial fibrillation on chronic anticoagulation    External Records Reviewed: I reviewed recent and relevant outside records including: Patient cardiology note from November 17, 2023 at the [facility]    ED Course as of 12/07/23 1926   u Dec 07, 2023   1742 XR hip left with pelvis when performed 2 or 3 views  I independently reviewed the x-ray and noted that there was a femoral neck fracture.  Images relayed to on-call  orthopedics, Dr. Marte [WJ]   183 Spoke to orthopedics.  Velcro wrist brace okay.  Recommended admission for operative management. [WJ]   1854 Patient offered and declined transfer.  Patient placed in a splint.  Informed of findings. [WJ]      ED Course User Index  [WJ] Jacky Wu DO         Diagnoses as of 12/07/23 1926   Fall, initial encounter   Closed fracture of left hip, initial encounter (CMS/[facility])   History of anticoagulant therapy   Closed fracture of left wrist, initial encounter         Jacky Wu  Emergency Medicine               Pratik Coma Scale Score: 15                  Patient History   Past Medical History:   Diagnosis Date    Chronic rhinitis     Rhinitis    Personal history of other diseases of the circulatory system     History of cardiac arrhythmia    Personal history of other diseases of the circulatory system     History of peripheral vascular disease    Personal history of other diseases of the nervous system and sense organs     History of cataract    Personal history of other endocrine, nutritional and metabolic disease     History of type 2 diabetes mellitus    Personal history of other specified conditions     History of urinary frequency    Unspecified osteoarthritis, unspecified site     Osteoarthritis     Past Surgical History:   Procedure Laterality Date    OTHER SURGICAL HISTORY  10/24/2019    Cataract surgery    OTHER SURGICAL HISTORY  10/24/2019    Mastectomy partial    OTHER SURGICAL HISTORY  11/21/2019    Angioplasty    OTHER SURGICAL HISTORY  10/24/2019    Thyroidectomy    OTHER SURGICAL HISTORY  10/24/2019    Venous access port placement    OTHER SURGICAL HISTORY  10/24/2019    Pacemaker insertion     Family History   Problem Relation Name Age of Onset    Diabetes Father      Lung disease Father      Diabetes Paternal Grandmother      Diabetes Paternal Grandfather       Social History     Tobacco Use    Smoking status: Never    Smokeless tobacco: Never   Substance Use  Topics    Alcohol use: Never    Drug use: Never       Physical Exam   ED Triage Vitals [12/07/23 1629]   Temp Pulse Resp BP   36.8 °C (98.2 °F) -- 18 (!) 143/98      SpO2 Temp src Heart Rate Source Patient Position   95 % -- -- --      BP Location FiO2 (%)     -- --       Physical Exam    ED Course & [facility]   ED Course as of 12/07/23 1926   Thu Dec 07, 2023   1742 XR hip left with pelvis when performed 2 or 3 views  I independently reviewed the x-ray and noted that there was a femoral neck fracture.  Images relayed to on-call orthopedics, Dr. Marte [WJ]   1838 Spoke to orthopedics.  Velcro wrist brace okay.  Recommended admission for operative management. [WJ]   1854 Patient offered and declined transfer.  Patient placed in a splint.  Informed of findings. [WJ]      ED Course User Index  [WJ] Jacky Wu DO         Diagnoses as of 12/07/23 1926   Fall, initial encounter   Closed fracture of left hip, initial encounter (CMS/[facility])   History of anticoagulant therapy   Closed fracture of left wrist, initial encounter       Medical Decision Making      Procedure  Procedures     Jacky Wu DO  12/07/23 1927

## 2023-12-08 ENCOUNTER — ANESTHESIA (OUTPATIENT)
Dept: OPERATING ROOM | Facility: HOSPITAL | Age: 88
DRG: 480 | End: 2023-12-08
Payer: MEDICARE

## 2023-12-08 ENCOUNTER — ANESTHESIA EVENT (OUTPATIENT)
Dept: OPERATING ROOM | Facility: HOSPITAL | Age: 88
DRG: 480 | End: 2023-12-08
Payer: MEDICARE

## 2023-12-08 ENCOUNTER — APPOINTMENT (OUTPATIENT)
Dept: RADIOLOGY | Facility: HOSPITAL | Age: 88
DRG: 480 | End: 2023-12-08
Payer: MEDICARE

## 2023-12-08 PROBLEM — K21.9 GERD (GASTROESOPHAGEAL REFLUX DISEASE): Status: ACTIVE | Noted: 2023-12-08

## 2023-12-08 PROBLEM — S72.002A CLOSED FRACTURE OF LEFT HIP (MULTI): Status: ACTIVE | Noted: 2023-12-07

## 2023-12-08 PROBLEM — S62.102A LEFT WRIST FRACTURE, CLOSED, INITIAL ENCOUNTER: Status: ACTIVE | Noted: 2023-12-08

## 2023-12-08 PROBLEM — I25.10 CAD (CORONARY ARTERY DISEASE): Status: ACTIVE | Noted: 2023-12-08

## 2023-12-08 LAB
ALBUMIN SERPL BCP-MCNC: 3.8 G/DL (ref 3.4–5)
ANION GAP SERPL CALC-SCNC: 14 MMOL/L (ref 10–20)
BUN SERPL-MCNC: 23 MG/DL (ref 6–23)
CALCIUM SERPL-MCNC: 8.5 MG/DL (ref 8.6–10.3)
CHLORIDE SERPL-SCNC: 105 MMOL/L (ref 98–107)
CO2 SERPL-SCNC: 27 MMOL/L (ref 21–32)
CREAT SERPL-MCNC: 1.25 MG/DL (ref 0.5–1.05)
ERYTHROCYTE [DISTWIDTH] IN BLOOD BY AUTOMATED COUNT: 16.2 % (ref 11.5–14.5)
GFR SERPL CREATININE-BSD FRML MDRD: 41 ML/MIN/1.73M*2
GLUCOSE SERPL-MCNC: 131 MG/DL (ref 74–99)
HCT VFR BLD AUTO: 31.7 % (ref 36–46)
HGB BLD-MCNC: 10.1 G/DL (ref 12–16)
INR PPP: 1.9 (ref 0.9–1.1)
INR PPP: 2.1 (ref 0.9–1.1)
MAGNESIUM SERPL-MCNC: 1.7 MG/DL (ref 1.6–2.4)
MCH RBC QN AUTO: 29.4 PG (ref 26–34)
MCHC RBC AUTO-ENTMCNC: 31.9 G/DL (ref 32–36)
MCV RBC AUTO: 92 FL (ref 80–100)
NRBC BLD-RTO: 0 /100 WBCS (ref 0–0)
PHOSPHATE SERPL-MCNC: 4.1 MG/DL (ref 2.5–4.9)
PLATELET # BLD AUTO: 146 X10*3/UL (ref 150–450)
POTASSIUM SERPL-SCNC: 4.5 MMOL/L (ref 3.5–5.3)
PROTHROMBIN TIME: 22 SECONDS (ref 9.8–12.8)
PROTHROMBIN TIME: 23.7 SECONDS (ref 9.8–12.8)
RBC # BLD AUTO: 3.44 X10*6/UL (ref 4–5.2)
SODIUM SERPL-SCNC: 141 MMOL/L (ref 136–145)
WBC # BLD AUTO: 9.2 X10*3/UL (ref 4.4–11.3)

## 2023-12-08 PROCEDURE — C1713 ANCHOR/SCREW BN/BN,TIS/BN: HCPCS | Performed by: ORTHOPAEDIC SURGERY

## 2023-12-08 PROCEDURE — 83735 ASSAY OF MAGNESIUM: CPT | Performed by: INTERNAL MEDICINE

## 2023-12-08 PROCEDURE — 85610 PROTHROMBIN TIME: CPT | Performed by: INTERNAL MEDICINE

## 2023-12-08 PROCEDURE — 27245 TREAT THIGH FRACTURE: CPT | Performed by: ORTHOPAEDIC SURGERY

## 2023-12-08 PROCEDURE — 3600000009 HC OR TIME - EACH INCREMENTAL 1 MINUTE - PROCEDURE LEVEL FOUR: Performed by: ORTHOPAEDIC SURGERY

## 2023-12-08 PROCEDURE — 1200000002 HC GENERAL ROOM WITH TELEMETRY DAILY

## 2023-12-08 PROCEDURE — 2500000001 HC RX 250 WO HCPCS SELF ADMINISTERED DRUGS (ALT 637 FOR MEDICARE OP): Performed by: INTERNAL MEDICINE

## 2023-12-08 PROCEDURE — 2500000004 HC RX 250 GENERAL PHARMACY W/ HCPCS (ALT 636 FOR OP/ED): Performed by: LICENSED PRACTICAL NURSE

## 2023-12-08 PROCEDURE — 3700000001 HC GENERAL ANESTHESIA TIME - INITIAL BASE CHARGE: Performed by: ORTHOPAEDIC SURGERY

## 2023-12-08 PROCEDURE — 2500000004 HC RX 250 GENERAL PHARMACY W/ HCPCS (ALT 636 FOR OP/ED): Performed by: ANESTHESIOLOGY

## 2023-12-08 PROCEDURE — 2780000003 HC OR 278 NO HCPCS: Performed by: ORTHOPAEDIC SURGERY

## 2023-12-08 PROCEDURE — 85610 PROTHROMBIN TIME: CPT | Performed by: ORTHOPAEDIC SURGERY

## 2023-12-08 PROCEDURE — 7100000001 HC RECOVERY ROOM TIME - INITIAL BASE CHARGE: Performed by: ORTHOPAEDIC SURGERY

## 2023-12-08 PROCEDURE — 2500000001 HC RX 250 WO HCPCS SELF ADMINISTERED DRUGS (ALT 637 FOR MEDICARE OP): Performed by: PHYSICIAN ASSISTANT

## 2023-12-08 PROCEDURE — 99222 1ST HOSP IP/OBS MODERATE 55: CPT | Performed by: ORTHOPAEDIC SURGERY

## 2023-12-08 PROCEDURE — 2500000004 HC RX 250 GENERAL PHARMACY W/ HCPCS (ALT 636 FOR OP/ED): Performed by: ORTHOPAEDIC SURGERY

## 2023-12-08 PROCEDURE — 2500000001 HC RX 250 WO HCPCS SELF ADMINISTERED DRUGS (ALT 637 FOR MEDICARE OP): Performed by: ORTHOPAEDIC SURGERY

## 2023-12-08 PROCEDURE — 94760 N-INVAS EAR/PLS OXIMETRY 1: CPT

## 2023-12-08 PROCEDURE — 2500000005 HC RX 250 GENERAL PHARMACY W/O HCPCS: Performed by: LICENSED PRACTICAL NURSE

## 2023-12-08 PROCEDURE — 2500000004 HC RX 250 GENERAL PHARMACY W/ HCPCS (ALT 636 FOR OP/ED): Performed by: INTERNAL MEDICINE

## 2023-12-08 PROCEDURE — 76000 FLUOROSCOPY <1 HR PHYS/QHP: CPT

## 2023-12-08 PROCEDURE — 80069 RENAL FUNCTION PANEL: CPT | Performed by: INTERNAL MEDICINE

## 2023-12-08 PROCEDURE — 2720000007 HC OR 272 NO HCPCS: Performed by: ORTHOPAEDIC SURGERY

## 2023-12-08 PROCEDURE — 99233 SBSQ HOSP IP/OBS HIGH 50: CPT | Performed by: PHYSICIAN ASSISTANT

## 2023-12-08 PROCEDURE — 85027 COMPLETE CBC AUTOMATED: CPT | Performed by: INTERNAL MEDICINE

## 2023-12-08 PROCEDURE — 7100000002 HC RECOVERY ROOM TIME - EACH INCREMENTAL 1 MINUTE: Performed by: ORTHOPAEDIC SURGERY

## 2023-12-08 PROCEDURE — 3700000002 HC GENERAL ANESTHESIA TIME - EACH INCREMENTAL 1 MINUTE: Performed by: ORTHOPAEDIC SURGERY

## 2023-12-08 PROCEDURE — 3600000004 HC OR TIME - INITIAL BASE CHARGE - PROCEDURE LEVEL FOUR: Performed by: ORTHOPAEDIC SURGERY

## 2023-12-08 PROCEDURE — 0QS704Z REPOSITION LEFT UPPER FEMUR WITH INTERNAL FIXATION DEVICE, OPEN APPROACH: ICD-10-PCS | Performed by: ORTHOPAEDIC SURGERY

## 2023-12-08 PROCEDURE — 2500000005 HC RX 250 GENERAL PHARMACY W/O HCPCS: Performed by: ORTHOPAEDIC SURGERY

## 2023-12-08 DEVICE — TFNA FENESTRATED SCREW 110MM - STERILE
Type: IMPLANTABLE DEVICE | Site: HIP | Status: FUNCTIONAL
Brand: TFN-ADVANCE

## 2023-12-08 DEVICE — 11MM/130 DEG TI CANN TFNA 170MM - STERILE
Type: IMPLANTABLE DEVICE | Site: HIP | Status: FUNCTIONAL
Brand: TFN-ADVANCE

## 2023-12-08 DEVICE — IMPLANTABLE DEVICE: Type: IMPLANTABLE DEVICE | Site: HIP | Status: FUNCTIONAL

## 2023-12-08 RX ORDER — PROPOFOL 10 MG/ML
INJECTION, EMULSION INTRAVENOUS AS NEEDED
Status: DISCONTINUED | OUTPATIENT
Start: 2023-12-08 | End: 2023-12-08

## 2023-12-08 RX ORDER — FAMOTIDINE 10 MG/ML
20 INJECTION INTRAVENOUS ONCE
Status: COMPLETED | OUTPATIENT
Start: 2023-12-08 | End: 2023-12-08

## 2023-12-08 RX ORDER — LABETALOL HYDROCHLORIDE 5 MG/ML
5 INJECTION, SOLUTION INTRAVENOUS ONCE AS NEEDED
Status: DISCONTINUED | OUTPATIENT
Start: 2023-12-08 | End: 2023-12-08 | Stop reason: HOSPADM

## 2023-12-08 RX ORDER — MEPERIDINE HYDROCHLORIDE 50 MG/ML
12.5 INJECTION INTRAMUSCULAR; INTRAVENOUS; SUBCUTANEOUS EVERY 10 MIN PRN
Status: DISCONTINUED | OUTPATIENT
Start: 2023-12-08 | End: 2023-12-08 | Stop reason: HOSPADM

## 2023-12-08 RX ORDER — SODIUM CHLORIDE, SODIUM LACTATE, POTASSIUM CHLORIDE, CALCIUM CHLORIDE 600; 310; 30; 20 MG/100ML; MG/100ML; MG/100ML; MG/100ML
100 INJECTION, SOLUTION INTRAVENOUS CONTINUOUS
Status: DISCONTINUED | OUTPATIENT
Start: 2023-12-08 | End: 2023-12-08 | Stop reason: HOSPADM

## 2023-12-08 RX ORDER — FENTANYL CITRATE 50 UG/ML
INJECTION, SOLUTION INTRAMUSCULAR; INTRAVENOUS AS NEEDED
Status: DISCONTINUED | OUTPATIENT
Start: 2023-12-08 | End: 2023-12-08

## 2023-12-08 RX ORDER — WARFARIN SODIUM 5 MG/1
5 TABLET ORAL ONCE
Status: COMPLETED | OUTPATIENT
Start: 2023-12-08 | End: 2023-12-08

## 2023-12-08 RX ORDER — BUPIVACAINE HCL/EPINEPHRINE 0.5-1:200K
VIAL (ML) INJECTION AS NEEDED
Status: DISCONTINUED | OUTPATIENT
Start: 2023-12-08 | End: 2023-12-08 | Stop reason: HOSPADM

## 2023-12-08 RX ORDER — MORPHINE SULFATE 2 MG/ML
2 INJECTION, SOLUTION INTRAMUSCULAR; INTRAVENOUS EVERY 5 MIN PRN
Status: DISCONTINUED | OUTPATIENT
Start: 2023-12-08 | End: 2023-12-08 | Stop reason: HOSPADM

## 2023-12-08 RX ORDER — MIRTAZAPINE 15 MG/1
15 TABLET, FILM COATED ORAL NIGHTLY
Status: DISCONTINUED | OUTPATIENT
Start: 2023-12-08 | End: 2023-12-11

## 2023-12-08 RX ORDER — ALBUTEROL SULFATE 0.83 MG/ML
2.5 SOLUTION RESPIRATORY (INHALATION) ONCE AS NEEDED
Status: DISCONTINUED | OUTPATIENT
Start: 2023-12-08 | End: 2023-12-08 | Stop reason: HOSPADM

## 2023-12-08 RX ORDER — CEFAZOLIN SODIUM 2 G/100ML
2 INJECTION, SOLUTION INTRAVENOUS EVERY 8 HOURS
Status: COMPLETED | OUTPATIENT
Start: 2023-12-08 | End: 2023-12-09

## 2023-12-08 RX ORDER — ONDANSETRON HYDROCHLORIDE 2 MG/ML
4 INJECTION, SOLUTION INTRAVENOUS ONCE AS NEEDED
Status: DISCONTINUED | OUTPATIENT
Start: 2023-12-08 | End: 2023-12-08 | Stop reason: HOSPADM

## 2023-12-08 RX ORDER — TRANEXAMIC ACID 100 MG/ML
INJECTION, SOLUTION INTRAVENOUS AS NEEDED
Status: DISCONTINUED | OUTPATIENT
Start: 2023-12-08 | End: 2023-12-08

## 2023-12-08 RX ORDER — CEFAZOLIN SODIUM 1 G/50ML
SOLUTION INTRAVENOUS AS NEEDED
Status: DISCONTINUED | OUTPATIENT
Start: 2023-12-08 | End: 2023-12-08

## 2023-12-08 RX ORDER — HEPARIN SODIUM (PORCINE) LOCK FLUSH IV SOLN 100 UNIT/ML 100 UNIT/ML
500 SOLUTION INTRAVENOUS ONCE AS NEEDED
Status: DISCONTINUED | OUTPATIENT
Start: 2023-12-08 | End: 2023-12-11

## 2023-12-08 RX ORDER — HEPARIN SODIUM,PORCINE/PF 10 UNIT/ML
50 SYRINGE (ML) INTRAVENOUS AS NEEDED
Status: DISCONTINUED | OUTPATIENT
Start: 2023-12-08 | End: 2023-12-11

## 2023-12-08 RX ORDER — TRANEXAMIC ACID 100 MG/ML
INJECTION, SOLUTION INTRAVENOUS AS NEEDED
Status: DISCONTINUED | OUTPATIENT
Start: 2023-12-08 | End: 2023-12-08 | Stop reason: HOSPADM

## 2023-12-08 RX ORDER — UBIDECARENONE 75 MG
500 CAPSULE ORAL DAILY
Status: DISCONTINUED | OUTPATIENT
Start: 2023-12-08 | End: 2023-12-11

## 2023-12-08 RX ORDER — HEPARIN SODIUM,PORCINE/PF 10 UNIT/ML
50 SYRINGE (ML) INTRAVENOUS EVERY 12 HOURS
Status: DISCONTINUED | OUTPATIENT
Start: 2023-12-08 | End: 2023-12-11

## 2023-12-08 RX ORDER — HYDRALAZINE HYDROCHLORIDE 20 MG/ML
5 INJECTION INTRAMUSCULAR; INTRAVENOUS EVERY 30 MIN PRN
Status: DISCONTINUED | OUTPATIENT
Start: 2023-12-08 | End: 2023-12-08 | Stop reason: HOSPADM

## 2023-12-08 RX ORDER — DEXAMETHASONE SODIUM PHOSPHATE 4 MG/ML
INJECTION, SOLUTION INTRA-ARTICULAR; INTRALESIONAL; INTRAMUSCULAR; INTRAVENOUS; SOFT TISSUE AS NEEDED
Status: DISCONTINUED | OUTPATIENT
Start: 2023-12-08 | End: 2023-12-08

## 2023-12-08 RX ORDER — DROPERIDOL 2.5 MG/ML
0.62 INJECTION, SOLUTION INTRAMUSCULAR; INTRAVENOUS ONCE AS NEEDED
Status: DISCONTINUED | OUTPATIENT
Start: 2023-12-08 | End: 2023-12-08 | Stop reason: HOSPADM

## 2023-12-08 RX ORDER — LIDOCAINE HYDROCHLORIDE 10 MG/ML
0.1 INJECTION INFILTRATION; PERINEURAL ONCE
Status: DISCONTINUED | OUTPATIENT
Start: 2023-12-08 | End: 2023-12-08 | Stop reason: HOSPADM

## 2023-12-08 RX ORDER — SODIUM CHLORIDE 9 MG/ML
150 INJECTION, SOLUTION INTRAVENOUS CONTINUOUS
Status: DISCONTINUED | OUTPATIENT
Start: 2023-12-08 | End: 2023-12-11

## 2023-12-08 RX ADMIN — MORPHINE SULFATE 2 MG: 2 INJECTION, SOLUTION INTRAMUSCULAR; INTRAVENOUS at 01:30

## 2023-12-08 RX ADMIN — TRANEXAMIC ACID 1000 MG: 1 INJECTION, SOLUTION INTRAVENOUS at 10:39

## 2023-12-08 RX ADMIN — PANTOPRAZOLE SODIUM 40 MG: 40 TABLET, DELAYED RELEASE ORAL at 08:52

## 2023-12-08 RX ADMIN — CEFAZOLIN SODIUM 2 G: 2 INJECTION, SOLUTION INTRAVENOUS at 20:19

## 2023-12-08 RX ADMIN — WARFARIN SODIUM 5 MG: 5 TABLET ORAL at 18:22

## 2023-12-08 RX ADMIN — SODIUM CHLORIDE 50 ML/HR: 9 INJECTION, SOLUTION INTRAVENOUS at 11:19

## 2023-12-08 RX ADMIN — HYDROMORPHONE HYDROCHLORIDE 0.5 MG: 0.5 INJECTION, SOLUTION INTRAMUSCULAR; INTRAVENOUS; SUBCUTANEOUS at 14:30

## 2023-12-08 RX ADMIN — METOPROLOL TARTRATE 50 MG: 50 TABLET, FILM COATED ORAL at 08:52

## 2023-12-08 RX ADMIN — SODIUM CHLORIDE 50 ML/HR: 9 INJECTION, SOLUTION INTRAVENOUS at 15:37

## 2023-12-08 RX ADMIN — AMITRIPTYLINE HYDROCHLORIDE 10 MG: 10 TABLET, FILM COATED ORAL at 20:19

## 2023-12-08 RX ADMIN — FAMOTIDINE 20 MG: 10 INJECTION, SOLUTION INTRAVENOUS at 11:09

## 2023-12-08 RX ADMIN — CEFAZOLIN SODIUM 1 G: 1 INJECTION, SOLUTION INTRAVENOUS at 12:24

## 2023-12-08 RX ADMIN — FENTANYL CITRATE 25 MCG: 50 INJECTION, SOLUTION INTRAMUSCULAR; INTRAVENOUS at 11:30

## 2023-12-08 RX ADMIN — LEVOTHYROXINE SODIUM 100 MCG: 0.1 TABLET ORAL at 08:52

## 2023-12-08 RX ADMIN — MORPHINE SULFATE 2 MG: 2 INJECTION, SOLUTION INTRAMUSCULAR; INTRAVENOUS at 08:57

## 2023-12-08 RX ADMIN — DEXAMETHASONE SODIUM PHOSPHATE 4 MG: 4 INJECTION INTRA-ARTICULAR; INTRALESIONAL; INTRAMUSCULAR; INTRAVENOUS; SOFT TISSUE at 12:35

## 2023-12-08 RX ADMIN — MIRTAZAPINE 15 MG: 15 TABLET, FILM COATED ORAL at 20:19

## 2023-12-08 RX ADMIN — CEFAZOLIN SODIUM 1 G: 1 INJECTION, SOLUTION INTRAVENOUS at 12:23

## 2023-12-08 RX ADMIN — PROMETHAZINE HYDROCHLORIDE 12.5 MG: 25 INJECTION INTRAMUSCULAR; INTRAVENOUS at 14:16

## 2023-12-08 RX ADMIN — TRANEXAMIC ACID 1000 MG: 100 INJECTION, SOLUTION INTRAVENOUS at 12:16

## 2023-12-08 RX ADMIN — ONDANSETRON 4 MG: 2 INJECTION, SOLUTION INTRAMUSCULAR; INTRAVENOUS at 13:05

## 2023-12-08 RX ADMIN — METOPROLOL TARTRATE 50 MG: 50 TABLET, FILM COATED ORAL at 20:19

## 2023-12-08 SDOH — HEALTH STABILITY: MENTAL HEALTH: CURRENT SMOKER: 0

## 2023-12-08 SDOH — SOCIAL STABILITY: SOCIAL INSECURITY: ABUSE: ADULT

## 2023-12-08 SDOH — SOCIAL STABILITY: SOCIAL INSECURITY: HAVE YOU HAD THOUGHTS OF HARMING ANYONE ELSE?: NO

## 2023-12-08 SDOH — SOCIAL STABILITY: SOCIAL INSECURITY: WERE YOU ABLE TO COMPLETE ALL THE BEHAVIORAL HEALTH SCREENINGS?: YES

## 2023-12-08 ASSESSMENT — COGNITIVE AND FUNCTIONAL STATUS - GENERAL
TOILETING: A LITTLE
DRESSING REGULAR UPPER BODY CLOTHING: A LITTLE
CLIMB 3 TO 5 STEPS WITH RAILING: TOTAL
DAILY ACTIVITIY SCORE: 13
TOILETING: A LOT
MOBILITY SCORE: 18
CLIMB 3 TO 5 STEPS WITH RAILING: A LITTLE
WALKING IN HOSPITAL ROOM: A LOT
MOVING TO AND FROM BED TO CHAIR: A LOT
HELP NEEDED FOR BATHING: A LITTLE
MOVING FROM LYING ON BACK TO SITTING ON SIDE OF FLAT BED WITH BEDRAILS: A LOT
DRESSING REGULAR LOWER BODY CLOTHING: A LITTLE
PERSONAL GROOMING: A LITTLE
TURNING FROM BACK TO SIDE WHILE IN FLAT BAD: A LITTLE
STANDING UP FROM CHAIR USING ARMS: A LITTLE
PATIENT BASELINE BEDBOUND: NO
WALKING IN HOSPITAL ROOM: A LOT
DAILY ACTIVITIY SCORE: 18
DRESSING REGULAR LOWER BODY CLOTHING: TOTAL
DRESSING REGULAR UPPER BODY CLOTHING: A LOT
EATING MEALS: A LITTLE
MOBILITY SCORE: 11
DRESSING REGULAR LOWER BODY CLOTHING: TOTAL
WALKING IN HOSPITAL ROOM: A LITTLE
EATING MEALS: A LITTLE
TOILETING: A LOT
TURNING FROM BACK TO SIDE WHILE IN FLAT BAD: A LOT
STANDING UP FROM CHAIR USING ARMS: A LOT
PERSONAL GROOMING: A LITTLE
MOBILITY SCORE: 11
DAILY ACTIVITIY SCORE: 13
HELP NEEDED FOR BATHING: A LOT
DRESSING REGULAR UPPER BODY CLOTHING: A LOT
HELP NEEDED FOR BATHING: A LOT
MOVING FROM LYING ON BACK TO SITTING ON SIDE OF FLAT BED WITH BEDRAILS: A LOT
TURNING FROM BACK TO SIDE WHILE IN FLAT BAD: A LOT
PERSONAL GROOMING: A LITTLE
MOVING TO AND FROM BED TO CHAIR: A LOT
MOVING TO AND FROM BED TO CHAIR: A LITTLE
EATING MEALS: A LITTLE
STANDING UP FROM CHAIR USING ARMS: A LOT
CLIMB 3 TO 5 STEPS WITH RAILING: TOTAL
MOVING FROM LYING ON BACK TO SITTING ON SIDE OF FLAT BED WITH BEDRAILS: A LITTLE

## 2023-12-08 ASSESSMENT — PAIN - FUNCTIONAL ASSESSMENT
PAIN_FUNCTIONAL_ASSESSMENT: WONG-BAKER FACES
PAIN_FUNCTIONAL_ASSESSMENT: 0-10
PAIN_FUNCTIONAL_ASSESSMENT: 0-10
PAIN_FUNCTIONAL_ASSESSMENT: WONG-BAKER FACES
PAIN_FUNCTIONAL_ASSESSMENT: 0-10
PAIN_FUNCTIONAL_ASSESSMENT: 0-10
PAIN_FUNCTIONAL_ASSESSMENT: WONG-BAKER FACES
PAIN_FUNCTIONAL_ASSESSMENT: 0-10

## 2023-12-08 ASSESSMENT — ENCOUNTER SYMPTOMS
SHORTNESS OF BREATH: 0
DIZZINESS: 0
BACK PAIN: 0
WEAKNESS: 0
TROUBLE SWALLOWING: 0
JOINT SWELLING: 0
CONSTIPATION: 0
NUMBNESS: 0
ABDOMINAL PAIN: 0
FACIAL SWELLING: 0
FREQUENCY: 0
FATIGUE: 0
CHEST TIGHTNESS: 0
DIARRHEA: 0
EYE PAIN: 0
HALLUCINATIONS: 0
LIGHT-HEADEDNESS: 0
APPETITE CHANGE: 0
WHEEZING: 0
HEADACHES: 0
WOUND: 0
COUGH: 0
BRUISES/BLEEDS EASILY: 0
HEMATURIA: 0
PALPITATIONS: 0
VOMITING: 0
DIAPHORESIS: 0
FLANK PAIN: 0
NAUSEA: 0
DYSURIA: 0
BLOOD IN STOOL: 0
CHILLS: 0
FEVER: 0
SORE THROAT: 0

## 2023-12-08 ASSESSMENT — ACTIVITIES OF DAILY LIVING (ADL)
DRESSING YOURSELF: INDEPENDENT
PATIENT'S MEMORY ADEQUATE TO SAFELY COMPLETE DAILY ACTIVITIES?: YES
ADEQUATE_TO_COMPLETE_ADL: YES
BATHING: INDEPENDENT
HEARING - RIGHT EAR: FUNCTIONAL
TOILETING: INDEPENDENT
WALKS IN HOME: INDEPENDENT
GROOMING: INDEPENDENT
JUDGMENT_ADEQUATE_SAFELY_COMPLETE_DAILY_ACTIVITIES: YES
HEARING - LEFT EAR: FUNCTIONAL
FEEDING YOURSELF: INDEPENDENT

## 2023-12-08 ASSESSMENT — LIFESTYLE VARIABLES
EVER HAD A DRINK FIRST THING IN THE MORNING TO STEADY YOUR NERVES TO GET RID OF A HANGOVER: NO
HAVE PEOPLE ANNOYED YOU BY CRITICIZING YOUR DRINKING: NO
HAVE YOU EVER FELT YOU SHOULD CUT DOWN ON YOUR DRINKING: NO
REASON UNABLE TO ASSESS: NO
EVER FELT BAD OR GUILTY ABOUT YOUR DRINKING: NO

## 2023-12-08 ASSESSMENT — PAIN SCALES - GENERAL
PAINLEVEL_OUTOF10: 0 - NO PAIN
PAINLEVEL_OUTOF10: 8
PAINLEVEL_OUTOF10: 0 - NO PAIN
PAINLEVEL_OUTOF10: 0 - NO PAIN
PAINLEVEL_OUTOF10: 9
PAINLEVEL_OUTOF10: 5 - MODERATE PAIN
PAIN_LEVEL: 7
PAINLEVEL_OUTOF10: 0 - NO PAIN
PAINLEVEL_OUTOF10: 0 - NO PAIN
PAINLEVEL_OUTOF10: 9
PAINLEVEL_OUTOF10: 0 - NO PAIN

## 2023-12-08 ASSESSMENT — PAIN DESCRIPTION - PROGRESSION: CLINICAL_PROGRESSION: NOT CHANGED

## 2023-12-08 ASSESSMENT — PATIENT HEALTH QUESTIONNAIRE - PHQ9
1. LITTLE INTEREST OR PLEASURE IN DOING THINGS: NOT AT ALL
SUM OF ALL RESPONSES TO PHQ9 QUESTIONS 1 & 2: 0
2. FEELING DOWN, DEPRESSED OR HOPELESS: NOT AT ALL

## 2023-12-08 NOTE — ANESTHESIA PROCEDURE NOTES
Airway  Date/Time: 12/8/2023 12:22 PM  Urgency: elective    Airway not difficult    Staffing  Performed: CRNA   Authorized by: BEVERLY Bolanos    Performed by: JOSÉ Bolanos-SHAILA  Patient location during procedure: OR    Indications and Patient Condition  Indications for airway management: anesthesia  Spontaneous ventilation: present  Sedation level: deep  Preoxygenated: yes  Patient position: sniffing  Mask difficulty assessment: 0 - not attempted  No planned trial extubation    Final Airway Details  Final airway type: supraglottic airway      Successful airway: Supraglottic airway: i-gel.  Size 3     Number of attempts at approach: 1  Ventilation between attempts: BVM  Number of other approaches attempted: 0    Additional Comments  Inserted without difficulty

## 2023-12-08 NOTE — ANESTHESIA PREPROCEDURE EVALUATION
Close monitoring with labs  Primary services following   Patient: Lyn Turner    Procedure Information       Date/Time: 12/08/23 1210    Procedure: LEFT Hip Fracture open reduction internal fixation with nail (synthes) * C ARM* (Left: Hip) - 1hr    Location: [facility] OR 06 / Virtual [facility] OR    Surgeons: Leon Berry, DO            Relevant Problems   Cardiovascular   (+) Atrial fibrillation (CMS/HCC)   (+) Bilateral carotid artery stenosis   (+) Peripheral arterial disease (CMS/HCC)      GI   (+) GI bleeding   (+) Gastric ulcer      /Renal   (+) CKD (chronic kidney disease) stage 3, GFR 30-59 ml/min (CMS/HCC)      Neuro/Psych   (+) Bilateral carotid artery stenosis   (+) Depression, major, single episode, moderate (CMS/HCC)      Infectious Disease   (+) COVID-19 virus infection       Clinical information reviewed:   Tobacco  Allergies  Meds  Problems  Med Hx  Surg Hx   Fam Hx  Soc   Hx        NPO Detail:  No data recorded     Physical Exam    Airway  Mallampati: II  TM distance: >3 FB     Cardiovascular - normal exam     Dental    Pulmonary - normal exam     Abdominal - normal exam             Anesthesia Plan    ASA 3     general and regional   (Left PENG Block)  The patient is not a current smoker.    intravenous induction   Anesthetic plan and risks discussed with patient.  Use of blood products discussed with patient who.    Plan discussed with CRNA.

## 2023-12-08 NOTE — NURSING NOTE
Pt arrive to 3325 safely. Pt accompanied by her daughter and son. Pt resting comfortably, no pain verbalized. Dressing clean, dry, intact. Bed in lowest position, call light within reach, bed alarm on.

## 2023-12-08 NOTE — ANESTHESIA POSTPROCEDURE EVALUATION
Patient: Lyn Turner    Procedure Summary       Date: 12/08/23 Room / Location: [facility] OR 06 / Virtual [facility] OR    Anesthesia Start: 1216 Anesthesia Stop:     Procedure: LEFT Hip Fracture open reduction internal fixation with nail (synthes) * C ARM* (Left: Hip) Diagnosis:       Closed fracture of left hip, initial encounter (CMS/[facility])      (Closed fracture of left hip, initial encounter (CMS/[facility]) [S72.002A])    Surgeons: Leon Berry DO Responsible Provider: BEVERLY Bolanos    Anesthesia Type: general, regional ASA Status: 3            Anesthesia Type: general, regional    Vitals Value Taken Time   /78 12/08/23 1350   Temp 36.9 °C (98.4 °F) 12/08/23 1320   Pulse 77 12/08/23 1350   Resp 11 12/08/23 1350   SpO2 96 % 12/08/23 1350       Anesthesia Post Evaluation    Patient location during evaluation: PACU  Patient participation: complete - patient participated  Level of consciousness: awake  Pain score: 7  Pain management: adequate  Airway patency: patent  Cardiovascular status: acceptable  Respiratory status: acceptable  Hydration status: acceptable  Postoperative Nausea and Vomiting: moderate        No notable events documented.

## 2023-12-08 NOTE — ASSESSMENT & PLAN NOTE
-Irregularly irregular rate and rhythm on examination  -No signs of RVR on telemetry monitoring at this time  -INR level of 2.2 currently so we will hold evening dose of Coumadin and patient states that she has not taken any Coumadin earlier today  -Will repeat PT/INR in the morning  -If reversal is required then we will default to orthopedic surgery per their discretion  -Continued on home metoprolol

## 2023-12-08 NOTE — H&P
.History Of Present Illness  Lyn Turner is a 89 y.o.  female with a past medical history significant for HTN, HLD, CAD, atrial fibrillation on Coumadin, HFpEF, s/p PPM, breast cancer s/p chemo/radiation/left mastectomy, thyroid cancer s/p thyroidectomy, hypothyroidism, anxiety, depression and GERD.  Patient presented to the ED after a mechanical fall.  She states that she was walking from her kitchen into her living room where there is a small step and after she stepped down she was trying to talk to her family member who was behind her and when she turned she fell back landing on her left forearm as well as left hip.  She did not hit her head or lose consciousness.  She states that the step is maybe a 4 inch or less step between the 2 areas.  She denied any recent sick contacts, chemical/environmental exposures, changes in dietary habits or any recent traumatic events/falls other than noted above.  She denies any fevers, chills, night sweats, vision changes, auditory changes, change in taste/smell, loss of bowel/bladder control, loss consciousness, dizziness, vertigo, syncope, seizure-like activity, chest pain, palpitations, shortness of breath, coughing, wheezing, congestion, hemoptysis, hematemesis, abdominal pain, nausea, vomiting, diarrhea, constipation, dysuria, hematuria, dyschezia, hematochezia any lateralizing motor/sensory deficits other than noted above.    Patient states that she has had prior wrist surgeries and about 2 to 3 years ago had actually broken her wrist on the left side.  Patient states that she has not taken any Coumadin today.    ED course:  1.  Vital signs on presentation: Temperature 36.8 °C, heart rate in the 70s, respiratory rate of 18, blood pressure 143/98, pulse ox 95% on room air  2.  WBC of 10.1  3.  Hgb of 11.1  4.  Unknown baseline Hgb but does appear to be around baseline compared to labs from 2020 and brief mentions and limited access to outpatient records from  outside hospital system  5.  Glucose 133  6.  BUNs/creatinine of 24/1.35  7.  Unknown baseline creatinine but this does appear to be at baseline based off of labs from 2020 through 2022 and what was mentioned in the outpatient records which is again of limited access as it is an outside hospital system.  8.  PT/INR of 25.5/1.2  9.  Urinalysis was grossly unremarkable for any infectious etiologies  10.  CT lumbar noted a left proximal femoral fracture, no acute abnormalities, degenerative changes  11.  XR left hip: Acute left angulated/displaced intertrochanteric fracture with displacement of the trochanters.  12.  XR left wrist/forearm: Noted nearly seen subtle difference in appearance of the distal radius potentially due to impaction fracture versus difference in positioning  13.  The ED physician did discuss case with the on-call orthopedic surgeon who had stated the forearm had a likely buckle fracture with intact hardware from prior surgical intervention and that they planned on taking the patient to the OR for surgical repair of the hip tomorrow.    A 12 point ROS was performed with the patient denying any complaints at this time aside from those listed in the HPI above.    Past Medical History  She has a past medical history of Chronic rhinitis, Personal history of other diseases of the circulatory system, Personal history of other diseases of the circulatory system, Personal history of other diseases of the nervous system and sense organs, Personal history of other endocrine, nutritional and metabolic disease, Personal history of other specified conditions, and Unspecified osteoarthritis, unspecified site.    Surgical History  She has a past surgical history that includes Other surgical history (10/24/2019); Other surgical history (10/24/2019); Other surgical history (11/21/2019); Other surgical history (10/24/2019); Other surgical history (10/24/2019); and Other surgical history (10/24/2019).     Social  History  She reports that she has never smoked. She has never used smokeless tobacco. She reports that she does not drink alcohol and does not use drugs.    Family History  Family History   Problem Relation Name Age of Onset    Diabetes Father      Lung disease Father      Diabetes Paternal Grandmother      Diabetes Paternal Grandfather          Allergies  Codeine, Fenofibrate, Fosinopril, Levofloxacin, Benadryl allergy decongestant, Diphenhydramine hcl, and Oxycodone-acetaminophen    Review of Systems   Musculoskeletal:  Positive for back pain, gait problem, joint swelling and neck stiffness.   All other systems reviewed and are negative.       Physical Exam  Constitutional:       General: She is not in acute distress.     Appearance: Normal appearance. She is obese. She is not ill-appearing or diaphoretic.   HENT:      Head: Normocephalic and atraumatic.      Mouth/Throat:      Mouth: Mucous membranes are moist.      Pharynx: Oropharynx is clear.   Eyes:      Extraocular Movements: Extraocular movements intact.      Conjunctiva/sclera: Conjunctivae normal.      Pupils: Pupils are equal, round, and reactive to light.   Neck:      Vascular: No carotid bruit.   Cardiovascular:      Rate and Rhythm: Normal rate. Rhythm irregular.      Pulses: Normal pulses.      Heart sounds: No murmur heard.  Pulmonary:      Effort: Pulmonary effort is normal. No respiratory distress.      Breath sounds: Normal breath sounds. No wheezing or rhonchi.   Chest:      Chest wall: No tenderness.   Abdominal:      General: Abdomen is flat. Bowel sounds are normal. There is no distension.      Palpations: There is no mass.      Tenderness: There is no abdominal tenderness. There is no guarding or rebound.   Musculoskeletal:         General: Swelling, tenderness, deformity and signs of injury present.      Cervical back: Normal range of motion and neck supple. No rigidity or tenderness.      Right lower leg: No edema.      Left lower leg: No  edema.      Comments: Globally diminished range of motion likely in setting of chronic deconditioning but the left lower extremity is shortened and slightly externally rotated and the patient would not attempt range of motion/strength testing due to pain and discomfort.  She did have intact range of motion with flexion and extension at the hip and at the knee on the right but strength again globally being at about 4 out of 5 in the other extremities.,  Slight swelling at the left hip and knee, the left wrist is in a Velcro splint, slight ecchymoses of the left upper extremity   Skin:     General: Skin is warm.      Capillary Refill: Capillary refill takes less than 2 seconds.      Findings: Bruising present. No erythema, lesion or rash.   Neurological:      General: No focal deficit present.      Mental Status: She is alert and oriented to person, place, and time.      Cranial Nerves: No cranial nerve deficit.      Sensory: No sensory deficit.      Motor: Weakness present.      Gait: Gait abnormal.      Comments: Cranial nerves appear grossly intact with strength discrepancies likely in setting of fall as noted above, finger-to-nose intact in bilateral upper extremities and heel-to-shin with the right lower extremity is intact but unable to complete with the left lower extremity due to fracture   Psychiatric:         Mood and Affect: Mood normal.         Behavior: Behavior normal.         Thought Content: Thought content normal.         Judgment: Judgment normal.         SCHEDULED MEDICATIONS  amitriptyline, 10 mg, oral, Nightly  [START ON 12/8/2023] levothyroxine, 100 mcg, oral, Daily  metoprolol tartrate, 50 mg, oral, BID  pantoprazole, 40 mg, oral, Daily  [START ON 12/8/2023] valsartan, 160 mg, oral, Daily           CONTINUOUS MEDICATIONS  lactated Ringer's, 75 mL/hr, Last Rate: 75 mL/hr (12/07/23 2049)           PRN MEDICATIONS  PRN medications: acetaminophen, morphine, morphine, ondansetron      Last Recorded  "Vitals  Blood pressure 138/64, pulse 82, temperature 36.8 °C (98.2 °F), resp. rate 19, height 1.6 m (5' 3\"), weight 60.9 kg (134 lb 4.2 oz), SpO2 93 %.    Relevant Results    Results for orders placed or performed during the hospital encounter of 12/07/23 (from the past 24 hour(s))   CBC and Auto Differential   Result Value Ref Range    WBC 10.1 4.4 - 11.3 x10*3/uL    nRBC 0.0 0.0 - 0.0 /100 WBCs    RBC 3.76 (L) 4.00 - 5.20 x10*6/uL    Hemoglobin 11.1 (L) 12.0 - 16.0 g/dL    Hematocrit 34.1 (L) 36.0 - 46.0 %    MCV 91 80 - 100 fL    MCH 29.5 26.0 - 34.0 pg    MCHC 32.6 32.0 - 36.0 g/dL    RDW 16.0 (H) 11.5 - 14.5 %    Platelets 165 150 - 450 x10*3/uL    Neutrophils % 70.9 40.0 - 80.0 %    Immature Granulocytes %, Automated 1.4 (H) 0.0 - 0.9 %    Lymphocytes % 19.4 13.0 - 44.0 %    Monocytes % 4.6 2.0 - 10.0 %    Eosinophils % 3.5 0.0 - 6.0 %    Basophils % 0.2 0.0 - 2.0 %    Neutrophils Absolute 7.19 (H) 1.60 - 5.50 x10*3/uL    Immature Granulocytes Absolute, Automated 0.14 0.00 - 0.50 x10*3/uL    Lymphocytes Absolute 1.97 0.80 - 3.00 x10*3/uL    Monocytes Absolute 0.47 0.05 - 0.80 x10*3/uL    Eosinophils Absolute 0.35 0.00 - 0.40 x10*3/uL    Basophils Absolute 0.02 0.00 - 0.10 x10*3/uL   Protime-INR   Result Value Ref Range    Protime 25.5 (H) 9.8 - 12.8 seconds    INR 2.2 (H) 0.9 - 1.1   Comprehensive metabolic panel   Result Value Ref Range    Glucose 133 (H) 74 - 99 mg/dL    Sodium 138 136 - 145 mmol/L    Potassium 4.2 3.5 - 5.3 mmol/L    Chloride 104 98 - 107 mmol/L    Bicarbonate 27 21 - 32 mmol/L    Anion Gap 11 10 - 20 mmol/L    Urea Nitrogen 24 (H) 6 - 23 mg/dL    Creatinine 1.35 (H) 0.50 - 1.05 mg/dL    eGFR 37 (L) >60 mL/min/1.73m*2    Calcium 8.8 8.6 - 10.3 mg/dL    Albumin 4.0 3.4 - 5.0 g/dL    Alkaline Phosphatase 55 33 - 136 U/L    Total Protein 6.1 (L) 6.4 - 8.2 g/dL    AST 14 9 - 39 U/L    Bilirubin, Total 0.8 0.0 - 1.2 mg/dL    ALT 8 7 - 45 U/L   Type and Screen   Result Value Ref Range    ABO TYPE " A     Rh TYPE POS     ANTIBODY SCREEN NEG           XR hip left with pelvis when performed 2 or 3 views    Result Date: 12/7/2023  Interpreted By:  Charly Siddiqi, STUDY: XR HIP LEFT WITH PELVIS WHEN PERFORMED 2 OR 3 VIEWS; ;  12/7/2023 5:43 pm   INDICATION: Signs/Symptoms:fall, left forearm/wrist pain, lhip pain, lower back pain.   COMPARISON: December 2, 2022 CT scan abdomen and pelvis.   ACCESSION NUMBER(S): [id number]   ORDERING CLINICIAN: AVA COPELAND   FINDINGS: Acute displaced and angulated left intertrochanteric fractures including displacement of the trochanters. There are scattered degenerative changes and arterial vascular calcifications. No suspicious osseous lesion.       Acute left angulated and displaced intertrochanteric fractures with displacement of the trochanters. Depending on clinical concern CT correlation could be considered.     MACRO: None   Signed by: Charly Siddiqi 12/7/2023 6:32 PM Dictation workstation:   [id number]    CT lumbar spine wo IV contrast    Result Date: 12/7/2023  Interpreted By:  Charly Siddiqi, STUDY: CT LUMBAR SPINE WO IV CONTRAST  12/7/2023 6:01 pm   INDICATION: Signs/Symptoms:fall, left forearm/wrist pain, lhip pain, lower back pain   COMPARISON: None. December 2, 2022 CT abdomen and pelvis and same day left hip radiographs.   ACCESSION NUMBER(S): [id number]   ORDERING CLINICIAN: AVA COPELAND   TECHNIQUE: Axial CT images of the lumbar spine are obtained. Axial, coronal and sagittal reconstructions are provided for review.   FINDINGS: Alignment:  Stable mild rightward curvature lumbar spine. No evidence significant subluxation.   Vertebra and intervertebral disc spaces: There is underlying osseous demineralization limiting the accuracy of the assessment. Grossly stable moderate grade L2 compressive deformity. No evident acute lumbosacral spine fracture or suspicious osseous lesion. Left proximal femoral fracture noted on same day radiographs not well  delineated.   Grossly stable osseous findings related to multilevel spondylosis and degenerative disc changes.   Prevertebral/Paraspinal Soft Tissues: No detected acute hematoma. Grossly stable pre-existing findings including extensive arterial vascular calcifications, probable scar or atelectasis at the lung bases, colonic diverticulosis and probably benign renal cysts.       Left proximal femoral fractures noted on same day radiographs not well delineated.   No acute detected abnormality involving the lumbar spine.   MACRO: None   Signed by: Charly Siddiqi 12/7/2023 6:26 PM Dictation workstation:   [id number]    XR forearm left 2 views    Result Date: 12/7/2023  Interpreted By:  Charly Siddiqi, STUDY: XR WRIST LEFT 3+ VIEWS; XR FOREARM LEFT 2 VIEWS; ;  12/7/2023 5:43 pm   INDICATION: Signs/Symptoms:fall, left forearm/wrist pain, lhip pain, lower back pain.   COMPARISON: March 25, 2014 wrist radiographs.   ACCESSION NUMBER(S): [id number]; [id number]   ORDERING CLINICIAN: AVA COPELAND   FINDINGS: 2 views of the left forearm and three views of the left wrist were obtained. There is osseous demineralization which limits the accuracy of the assessment.   No detected elbow region fracture or significant elbow effusion.   Grossly intact plate screw fixation hardware distal radius. There is newly seen subtle buckle deformity involving the extra-articular portion of the distal radius potentially indicating acute impaction fracture versus chronic changes including findings related to difference in patient position. Attention recommended on clinical assessment. Stable seen positive ulnar variance measuring 4 mm. No gross displaced acute fractures. Polyarticular arthrosis and chondrocalcinosis. There are scattered arterial vascular calcifications.       Newly seen subtle difference in the appearance of the distal radius potentially due to impaction fracture versus difference in patient position. Attention recommended  on clinical assessment. Depending on concern CT correlation could be considered.   Additional findings as reported.       MACRO: None   Signed by: Charly Siddiqi 12/7/2023 6:18 PM Dictation workstation:   [id number]    XR wrist left 3+ views    Result Date: 12/7/2023  Interpreted By:  Charly Siddiqi, STUDY: XR WRIST LEFT 3+ VIEWS; XR FOREARM LEFT 2 VIEWS; ;  12/7/2023 5:43 pm   INDICATION: Signs/Symptoms:fall, left forearm/wrist pain, lhip pain, lower back pain.   COMPARISON: March 25, 2014 wrist radiographs.   ACCESSION NUMBER(S): [id number]; [id number]   ORDERING CLINICIAN: AVA COPELAND   FINDINGS: 2 views of the left forearm and three views of the left wrist were obtained. There is osseous demineralization which limits the accuracy of the assessment.   No detected elbow region fracture or significant elbow effusion.   Grossly intact plate screw fixation hardware distal radius. There is newly seen subtle buckle deformity involving the extra-articular portion of the distal radius potentially indicating acute impaction fracture versus chronic changes including findings related to difference in patient position. Attention recommended on clinical assessment. Stable seen positive ulnar variance measuring 4 mm. No gross displaced acute fractures. Polyarticular arthrosis and chondrocalcinosis. There are scattered arterial vascular calcifications.       Newly seen subtle difference in the appearance of the distal radius potentially due to impaction fracture versus difference in patient position. Attention recommended on clinical assessment. Depending on concern CT correlation could be considered.   Additional findings as reported.       MACRO: None   Signed by: Charly Siddiqi 12/7/2023 6:18 PM Dictation workstation:   [id number]          Assessment/Plan     1.  Mechanical fall  -Noted slight wrist fracture as well as left intertrochanteric fracture  -No syncopal episode, lightheadedness, dizziness or  seizure-like activity  -PT/OT appreciated    2.  Left intertrochanteric fracture  -Orthopedic consult appreciated  -Will hold home Coumadin at this time in anticipation for surgery tomorrow  -As needed morphine for analgesia and adjust accordingly  -PT/OT once weightbearing status is cleared by orthopedic surgery  -Further pain control per orthopedics    3.  Left wrist fracture  -Patient has had a prior fracture and surgical repair in the past  -Per discussion between the ED physician and orthopedics this appears to be a buckle fracture with intact hardware and to place a Velcro splint.  -Orthopedic consult appreciated  -Continued management per orthopedics     4. HTN, HLD, CAD  -Continued on home medications    5.  Atrial fibrillation  -Irregularly irregular rate and rhythm on examination  -No signs of RVR on telemetry monitoring at this time  -INR level of 2.2 currently so we will hold evening dose of Coumadin and patient states that she has not taken any Coumadin earlier today  -Will repeat PT/INR in the morning  -If reversal is required then we will default to orthopedic surgery per their discretion  -Continued on home metoprolol    6.  Hypothyroidism, history of thyroid cancer s/p thyroidectomy  -Continued on home Synthroid    7.  Anxiety/depression  -Continued on home medications    8.  GERD  -Continued on home medication      Glenn Edmond, DO

## 2023-12-08 NOTE — PROGRESS NOTES
Occupational Therapy                 Therapy Communication Note    Patient Name: Lyn Turner  MRN: [id number]  Today's Date: 12/8/2023     Discipline: Occupational Therapy    Missed Visit Reason: Missed Visit Reason: Patient placed on medical hold, Patient in a medical procedure (OT eval attempted. admit for hip fracture and wrist sprain s/p fall. the pt is in OR for hip repair. not appropriate right now. see for eval once stable post op.)    Missed Time: Attempt

## 2023-12-08 NOTE — ANESTHESIA PROCEDURE NOTES
Peripheral Block    Patient location during procedure: pre-op  Start time: 12/8/2023 11:32 AM  End time: 12/8/2023 11:40 AM  Reason for block: post-op pain management  Staffing  Performed: attending   Authorized by: Tunde Rojas MD    Performed by: Tunde Rojas MD  Preanesthetic Checklist  Completed: patient identified, IV checked, site marked, risks and benefits discussed, surgical consent, monitors and equipment checked, pre-op evaluation and timeout performed   Timeout performed at: 12/8/2023 11:23 AM  Peripheral Block  Patient position: laying flat  Prep: ChloraPrep  Patient monitoring: heart rate, cardiac monitor and continuous pulse ox  Block type: other  Laterality: left  Injection technique: single-shot  Guidance: ultrasound guided  Local infiltration: ropivacaine  Infiltration strength: 0.5 %  Dose: 15 mL  Needle  Needle type: long-bevel   Needle gauge: 22 G  Needle localization: ultrasound guidance  Needle insertion depth: 2 cm  Test dose: negative  Assessment  Injection assessment: negative aspiration for heme, no paresthesia on injection, incremental injection and local visualized surrounding nerve on ultrasound  Paresthesia pain: prolonged  Heart rate change: no  Slow fractionated injection: yes  Additional Notes  5 ml 2% lidocaine give for test dose.  Patient tolerated well.

## 2023-12-08 NOTE — ASSESSMENT & PLAN NOTE
-Patient has had a prior fracture and surgical repair in the past  -Per discussion between the ED physician and orthopedics this appears to be a buckle fracture with intact hardware and to place a Velcro splint.  -Orthopedic consult appreciated  -Continued management per orthopedics- cockup wrist splint with follow up in 2 weeks

## 2023-12-08 NOTE — CONSULTS
Reason For Consult  Left hip pain and left wrist pain after fall    History Of Present Illness  Lyn Turner is a 89 y.o. female presenting with left wrist and hip pain after fall.  The patient reports she was at home, walking down a small step, when she truend backwards and fell onto her left side. The DOI is 12/7, 1 day ago. Pain is controlled. Patient reports no numbness and tingling.  Reports no previous surgeries, injections, or trauma to the left hip but did have left wrist fracture treated with a plate years ago.  Reports pain worse with use, better at rest.  Pain dull ache, sharp at times. Takes coumadin.  Walked without any assistive devices usually, rarely a cane for long walks.     Past Medical History  She has a past medical history of Chronic rhinitis, Personal history of other diseases of the circulatory system, Personal history of other diseases of the circulatory system, Personal history of other diseases of the nervous system and sense organs, Personal history of other endocrine, nutritional and metabolic disease, Personal history of other specified conditions, and Unspecified osteoarthritis, unspecified site.    Surgical History  She has a past surgical history that includes Other surgical history (10/24/2019); Other surgical history (10/24/2019); Other surgical history (11/21/2019); Other surgical history (10/24/2019); Other surgical history (10/24/2019); and Other surgical history (10/24/2019).     Social History  She reports that she has never smoked. She has never used smokeless tobacco. She reports that she does not drink alcohol and does not use drugs.    Family History  Family History   Problem Relation Name Age of Onset    Diabetes Father      Lung disease Father      Diabetes Paternal Grandmother      Diabetes Paternal Grandfather          Allergies  Codeine, Fenofibrate, Fosinopril, Levofloxacin, Benadryl allergy decongestant, Diphenhydramine hcl, and Oxycodone-acetaminophen    Review  "of Systems    Constitutional: see HPI, no fever, no chills, not feeling tired, no significant weight gain or weight loss.   Eyes: No vision changes  ENT: no nosebleeds.   Cardiovascular: no chest pain.   Respiratory: no shortness of breath and no cough.   Gastrointestinal: no abdominal pain, no nausea, no vomiting and no diarrhea.   Musculoskeletal: per HPI  Integumentary: no rashes and no skin wound.   Neurological: no headache, no gait disturbances  Psychiatric: no depression and no sleep disturbances.   Endocrine: no muscle weakness and no muscle cramps.   Hematologic/Lymphatic: no swollen glands and no tendency for easy bruising or excessive swelling.     Patient's past medical history, past surgical history, allergies, and medications have been reviewed unless otherwise noted in the chart.      Physical Exam  LUE  SILT  Warm hand, BCR  Mild edema  Minimally TTP wrist    LLE  SILT  Warm foot, BCR  PF, DF< EHL intact  Leg shortened and externally rotated  Painful log roll and heel strike     Last Recorded Vitals  Blood pressure 120/65, pulse 69, temperature 36.8 °C (98.2 °F), resp. rate 18, height 1.6 m (5' 3\"), weight 60.9 kg (134 lb 4.2 oz), SpO2 96 %.    Relevant Results      Scheduled medications  amitriptyline, 10 mg, oral, Nightly  famotidine, 20 mg, intravenous, Once  levothyroxine, 100 mcg, oral, Daily  metoprolol tartrate, 50 mg, oral, BID  pantoprazole, 40 mg, oral, Daily  tranexamic acid, 1,000 mg, intravenous, Once  valsartan, 160 mg, oral, Daily      Continuous medications  sodium chloride 0.9%, 50 mL/hr      PRN medications  PRN medications: acetaminophen, morphine, morphine, ondansetron  Results for orders placed or performed during the hospital encounter of 12/07/23 (from the past 24 hour(s))   CBC and Auto Differential   Result Value Ref Range    WBC 10.1 4.4 - 11.3 x10*3/uL    nRBC 0.0 0.0 - 0.0 /100 WBCs    RBC 3.76 (L) 4.00 - 5.20 x10*6/uL    Hemoglobin 11.1 (L) 12.0 - 16.0 g/dL    Hematocrit " 34.1 (L) 36.0 - 46.0 %    MCV 91 80 - 100 fL    MCH 29.5 26.0 - 34.0 pg    MCHC 32.6 32.0 - 36.0 g/dL    RDW 16.0 (H) 11.5 - 14.5 %    Platelets 165 150 - 450 x10*3/uL    Neutrophils % 70.9 40.0 - 80.0 %    Immature Granulocytes %, Automated 1.4 (H) 0.0 - 0.9 %    Lymphocytes % 19.4 13.0 - 44.0 %    Monocytes % 4.6 2.0 - 10.0 %    Eosinophils % 3.5 0.0 - 6.0 %    Basophils % 0.2 0.0 - 2.0 %    Neutrophils Absolute 7.19 (H) 1.60 - 5.50 x10*3/uL    Immature Granulocytes Absolute, Automated 0.14 0.00 - 0.50 x10*3/uL    Lymphocytes Absolute 1.97 0.80 - 3.00 x10*3/uL    Monocytes Absolute 0.47 0.05 - 0.80 x10*3/uL    Eosinophils Absolute 0.35 0.00 - 0.40 x10*3/uL    Basophils Absolute 0.02 0.00 - 0.10 x10*3/uL   Protime-INR   Result Value Ref Range    Protime 25.5 (H) 9.8 - 12.8 seconds    INR 2.2 (H) 0.9 - 1.1   Comprehensive metabolic panel   Result Value Ref Range    Glucose 133 (H) 74 - 99 mg/dL    Sodium 138 136 - 145 mmol/L    Potassium 4.2 3.5 - 5.3 mmol/L    Chloride 104 98 - 107 mmol/L    Bicarbonate 27 21 - 32 mmol/L    Anion Gap 11 10 - 20 mmol/L    Urea Nitrogen 24 (H) 6 - 23 mg/dL    Creatinine 1.35 (H) 0.50 - 1.05 mg/dL    eGFR 37 (L) >60 mL/min/1.73m*2    Calcium 8.8 8.6 - 10.3 mg/dL    Albumin 4.0 3.4 - 5.0 g/dL    Alkaline Phosphatase 55 33 - 136 U/L    Total Protein 6.1 (L) 6.4 - 8.2 g/dL    AST 14 9 - 39 U/L    Bilirubin, Total 0.8 0.0 - 1.2 mg/dL    ALT 8 7 - 45 U/L   Type and Screen   Result Value Ref Range    ABO TYPE A     Rh TYPE POS     ANTIBODY SCREEN NEG    CBC   Result Value Ref Range    WBC 9.2 4.4 - 11.3 x10*3/uL    nRBC 0.0 0.0 - 0.0 /100 WBCs    RBC 3.44 (L) 4.00 - 5.20 x10*6/uL    Hemoglobin 10.1 (L) 12.0 - 16.0 g/dL    Hematocrit 31.7 (L) 36.0 - 46.0 %    MCV 92 80 - 100 fL    MCH 29.4 26.0 - 34.0 pg    MCHC 31.9 (L) 32.0 - 36.0 g/dL    RDW 16.2 (H) 11.5 - 14.5 %    Platelets 146 (L) 150 - 450 x10*3/uL   Renal function panel   Result Value Ref Range    Glucose 131 (H) 74 - 99 mg/dL     Sodium 141 136 - 145 mmol/L    Potassium 4.5 3.5 - 5.3 mmol/L    Chloride 105 98 - 107 mmol/L    Bicarbonate 27 21 - 32 mmol/L    Anion Gap 14 10 - 20 mmol/L    Urea Nitrogen 23 6 - 23 mg/dL    Creatinine 1.25 (H) 0.50 - 1.05 mg/dL    eGFR 41 (L) >60 mL/min/1.73m*2    Calcium 8.5 (L) 8.6 - 10.3 mg/dL    Phosphorus 4.1 2.5 - 4.9 mg/dL    Albumin 3.8 3.4 - 5.0 g/dL   Magnesium   Result Value Ref Range    Magnesium 1.70 1.60 - 2.40 mg/dL   Protime-INR   Result Value Ref Range    Protime 23.7 (H) 9.8 - 12.8 seconds    INR 2.1 (H) 0.9 - 1.1     XR - questionable impacted distal radius fracture with intact hardware    XR - left IT fracture with mild displacement, rotated     Assessment/Plan   Left wrist sprian vs ND fracture  I discussed the diagnosis and treatment options with the patient today along with their associated risks and benefits. After thorough discussion, the patient has elected to proceed with conservative management. All questions were answered to the patients satisfaction who seems satisfied with the plan.      Cock up wrist splint  FU 2 weeks - XR    Left IT fracture  Surgery discussion I discussed the diagnosis and treatment options with the patient today along with their associated risks and benefits. After thorough discussion, the patient has elected to proceed with surgical intervention. The patient understands the risks of bleeding, infection, loss of life or limb, pain, loss of motion, failure of the goals of surgery or the potential need for additional surgery. The patient would like to accept these risks and proceed. All questions were answered to the patients satisfaction and the patient seems satisfied with the plan.    Plan left hip IMN  NPO  Will follow    Leon Berry DO

## 2023-12-08 NOTE — ASSESSMENT & PLAN NOTE
-Noted slight wrist fracture as well as left intertrochanteric fracture  -No syncopal episode, lightheadedness, dizziness or seizure-like activity  -PT/OT appreciated

## 2023-12-08 NOTE — PROGRESS NOTES
Lyn Turner is a 89 y.o. female on day 0 of admission presenting with Fall, initial encounter.      Subjective   Lyn Turner is a 89 y.o.  female with a past medical history significant for HTN, HLD, CAD, atrial fibrillation on Coumadin, HFpEF, s/p PPM, breast cancer s/p chemo/radiation/left mastectomy, thyroid cancer s/p thyroidectomy, hypothyroidism, anxiety, depression and GERD who presented 12/7/23 after a mechanical fall.  She states that she was walking from her kitchen into her living room where there is a small step and after she stepped down she was trying to talk to her family member who was behind her and when she turned she fell back landing on her left forearm as well as left hip.  She did not hit her head or lose consciousness.  She states that the step is maybe a 4 inch or less step between the 2 areas. Patient states that she has had prior wrist surgeries and about 2 to 3 years ago had actually broken her wrist on the left side.  Patient states that she has not taken any Coumadin today. Urinalysis was grossly unremarkable for any infectious etiologies. CT lumbar noted a left proximal femoral fracture, no acute abnormalities, degenerative changes.  XR left hip: Acute left angulated/displaced intertrochanteric fracture with displacement of the trochanters.  XR left wrist/forearm: Noted nearly seen subtle difference in appearance of the distal radius potentially due to impaction fracture versus difference in positioning. The ED physician did discuss case with the on-call orthopedic surgeon who had stated the forearm had a likely buckle fracture with intact hardware from prior surgical intervention and that they planned on taking the patient to the OR for surgical repair of the hip.    12/08/23: No acute events overnight. Vitals stable. Labs largely unchanged. INR 2.1. Hgb 10.1 (11.1). Going to OR for L IMN today         Review of Systems   Constitutional:  Negative for appetite change,  chills, diaphoresis, fatigue and fever.   HENT:  Negative for congestion, ear pain, facial swelling, hearing loss, nosebleeds, sore throat, tinnitus and trouble swallowing.    Eyes:  Negative for pain.   Respiratory:  Negative for cough, chest tightness, shortness of breath and wheezing.    Cardiovascular:  Negative for chest pain, palpitations and leg swelling.   Gastrointestinal:  Negative for abdominal pain, blood in stool, constipation, diarrhea, nausea and vomiting.   Genitourinary:  Negative for dysuria, flank pain, frequency, hematuria and urgency.   Musculoskeletal:  Negative for back pain and joint swelling.        L wrist pain  L hip pain   Skin:  Negative for rash and wound.   Neurological:  Negative for dizziness, syncope, weakness, light-headedness, numbness and headaches.   Hematological:  Does not bruise/bleed easily.   Psychiatric/Behavioral:  Negative for behavioral problems, hallucinations and suicidal ideas.           Objective     Last Recorded Vitals  /65 (BP Location: Right arm, Patient Position: Lying)   Pulse 85   Temp 37.1 °C (98.8 °F) (Temporal)   Resp 14   Wt 60.9 kg (134 lb 4.2 oz)   SpO2 96%     Image Results  XR hip left with pelvis when performed 2 or 3 views    Result Date: 12/7/2023  Interpreted By:  Charly Siddiqi, STUDY: XR HIP LEFT WITH PELVIS WHEN PERFORMED 2 OR 3 VIEWS; ;  12/7/2023 5:43 pm   INDICATION: Signs/Symptoms:fall, left forearm/wrist pain, lhip pain, lower back pain.   COMPARISON: December 2, 2022 CT scan abdomen and pelvis.   ACCESSION NUMBER(S): [id number]   ORDERING CLINICIAN: AVA COPELAND   FINDINGS: Acute displaced and angulated left intertrochanteric fractures including displacement of the trochanters. There are scattered degenerative changes and arterial vascular calcifications. No suspicious osseous lesion.       Acute left angulated and displaced intertrochanteric fractures with displacement of the trochanters. Depending on clinical concern CT  correlation could be considered.     MACRO: None   Signed by: Charly Siddiqi 12/7/2023 6:32 PM Dictation workstation:   [id number]    CT lumbar spine wo IV contrast    Result Date: 12/7/2023  Interpreted By:  Charly Siddiqi, STUDY: CT LUMBAR SPINE WO IV CONTRAST  12/7/2023 6:01 pm   INDICATION: Signs/Symptoms:fall, left forearm/wrist pain, lhip pain, lower back pain   COMPARISON: None. December 2, 2022 CT abdomen and pelvis and same day left hip radiographs.   ACCESSION NUMBER(S): [id number]   ORDERING CLINICIAN: AVA COPELAND   TECHNIQUE: Axial CT images of the lumbar spine are obtained. Axial, coronal and sagittal reconstructions are provided for review.   FINDINGS: Alignment:  Stable mild rightward curvature lumbar spine. No evidence significant subluxation.   Vertebra and intervertebral disc spaces: There is underlying osseous demineralization limiting the accuracy of the assessment. Grossly stable moderate grade L2 compressive deformity. No evident acute lumbosacral spine fracture or suspicious osseous lesion. Left proximal femoral fracture noted on same day radiographs not well delineated.   Grossly stable osseous findings related to multilevel spondylosis and degenerative disc changes.   Prevertebral/Paraspinal Soft Tissues: No detected acute hematoma. Grossly stable pre-existing findings including extensive arterial vascular calcifications, probable scar or atelectasis at the lung bases, colonic diverticulosis and probably benign renal cysts.       Left proximal femoral fractures noted on same day radiographs not well delineated.   No acute detected abnormality involving the lumbar spine.   MACRO: None   Signed by: Charly Siddiqi 12/7/2023 6:26 PM Dictation workstation:   [id number]    XR forearm left 2 views    Result Date: 12/7/2023  Interpreted By:  Charly Siddiqi, STUDY: XR WRIST LEFT 3+ VIEWS; XR FOREARM LEFT 2 VIEWS; ;  12/7/2023 5:43 pm   INDICATION: Signs/Symptoms:fall, left forearm/wrist  pain, lhip pain, lower back pain.   COMPARISON: March 25, 2014 wrist radiographs.   ACCESSION NUMBER(S): [id number]; [id number]   ORDERING CLINICIAN: AVA COPELAND   FINDINGS: 2 views of the left forearm and three views of the left wrist were obtained. There is osseous demineralization which limits the accuracy of the assessment.   No detected elbow region fracture or significant elbow effusion.   Grossly intact plate screw fixation hardware distal radius. There is newly seen subtle buckle deformity involving the extra-articular portion of the distal radius potentially indicating acute impaction fracture versus chronic changes including findings related to difference in patient position. Attention recommended on clinical assessment. Stable seen positive ulnar variance measuring 4 mm. No gross displaced acute fractures. Polyarticular arthrosis and chondrocalcinosis. There are scattered arterial vascular calcifications.       Newly seen subtle difference in the appearance of the distal radius potentially due to impaction fracture versus difference in patient position. Attention recommended on clinical assessment. Depending on concern CT correlation could be considered.   Additional findings as reported.       MACRO: None   Signed by: Charly Siddiqi 12/7/2023 6:18 PM Dictation workstation:   [id number]    XR wrist left 3+ views    Result Date: 12/7/2023  Interpreted By:  Charly Siddiqi, STUDY: XR WRIST LEFT 3+ VIEWS; XR FOREARM LEFT 2 VIEWS; ;  12/7/2023 5:43 pm   INDICATION: Signs/Symptoms:fall, left forearm/wrist pain, lhip pain, lower back pain.   COMPARISON: March 25, 2014 wrist radiographs.   ACCESSION NUMBER(S): [id number]; [id number]   ORDERING CLINICIAN: AVA COPELAND   FINDINGS: 2 views of the left forearm and three views of the left wrist were obtained. There is osseous demineralization which limits the accuracy of the assessment.   No detected elbow region fracture or significant elbow effusion.    Grossly intact plate screw fixation hardware distal radius. There is newly seen subtle buckle deformity involving the extra-articular portion of the distal radius potentially indicating acute impaction fracture versus chronic changes including findings related to difference in patient position. Attention recommended on clinical assessment. Stable seen positive ulnar variance measuring 4 mm. No gross displaced acute fractures. Polyarticular arthrosis and chondrocalcinosis. There are scattered arterial vascular calcifications.       Newly seen subtle difference in the appearance of the distal radius potentially due to impaction fracture versus difference in patient position. Attention recommended on clinical assessment. Depending on concern CT correlation could be considered.   Additional findings as reported.       MACRO: None   Signed by: Charly Siddiqi 12/7/2023 6:18 PM Dictation workstation:   [id number]       Lab Results  Results for orders placed or performed during the hospital encounter of 12/07/23 (from the past 24 hour(s))   CBC and Auto Differential   Result Value Ref Range    WBC 10.1 4.4 - 11.3 x10*3/uL    nRBC 0.0 0.0 - 0.0 /100 WBCs    RBC 3.76 (L) 4.00 - 5.20 x10*6/uL    Hemoglobin 11.1 (L) 12.0 - 16.0 g/dL    Hematocrit 34.1 (L) 36.0 - 46.0 %    MCV 91 80 - 100 fL    MCH 29.5 26.0 - 34.0 pg    MCHC 32.6 32.0 - 36.0 g/dL    RDW 16.0 (H) 11.5 - 14.5 %    Platelets 165 150 - 450 x10*3/uL    Neutrophils % 70.9 40.0 - 80.0 %    Immature Granulocytes %, Automated 1.4 (H) 0.0 - 0.9 %    Lymphocytes % 19.4 13.0 - 44.0 %    Monocytes % 4.6 2.0 - 10.0 %    Eosinophils % 3.5 0.0 - 6.0 %    Basophils % 0.2 0.0 - 2.0 %    Neutrophils Absolute 7.19 (H) 1.60 - 5.50 x10*3/uL    Immature Granulocytes Absolute, Automated 0.14 0.00 - 0.50 x10*3/uL    Lymphocytes Absolute 1.97 0.80 - 3.00 x10*3/uL    Monocytes Absolute 0.47 0.05 - 0.80 x10*3/uL    Eosinophils Absolute 0.35 0.00 - 0.40 x10*3/uL    Basophils Absolute  0.02 0.00 - 0.10 x10*3/uL   Protime-INR   Result Value Ref Range    Protime 25.5 (H) 9.8 - 12.8 seconds    INR 2.2 (H) 0.9 - 1.1   Comprehensive metabolic panel   Result Value Ref Range    Glucose 133 (H) 74 - 99 mg/dL    Sodium 138 136 - 145 mmol/L    Potassium 4.2 3.5 - 5.3 mmol/L    Chloride 104 98 - 107 mmol/L    Bicarbonate 27 21 - 32 mmol/L    Anion Gap 11 10 - 20 mmol/L    Urea Nitrogen 24 (H) 6 - 23 mg/dL    Creatinine 1.35 (H) 0.50 - 1.05 mg/dL    eGFR 37 (L) >60 mL/min/1.73m*2    Calcium 8.8 8.6 - 10.3 mg/dL    Albumin 4.0 3.4 - 5.0 g/dL    Alkaline Phosphatase 55 33 - 136 U/L    Total Protein 6.1 (L) 6.4 - 8.2 g/dL    AST 14 9 - 39 U/L    Bilirubin, Total 0.8 0.0 - 1.2 mg/dL    ALT 8 7 - 45 U/L   Type and Screen   Result Value Ref Range    ABO TYPE A     Rh TYPE POS     ANTIBODY SCREEN NEG    CBC   Result Value Ref Range    WBC 9.2 4.4 - 11.3 x10*3/uL    nRBC 0.0 0.0 - 0.0 /100 WBCs    RBC 3.44 (L) 4.00 - 5.20 x10*6/uL    Hemoglobin 10.1 (L) 12.0 - 16.0 g/dL    Hematocrit 31.7 (L) 36.0 - 46.0 %    MCV 92 80 - 100 fL    MCH 29.4 26.0 - 34.0 pg    MCHC 31.9 (L) 32.0 - 36.0 g/dL    RDW 16.2 (H) 11.5 - 14.5 %    Platelets 146 (L) 150 - 450 x10*3/uL   Renal function panel   Result Value Ref Range    Glucose 131 (H) 74 - 99 mg/dL    Sodium 141 136 - 145 mmol/L    Potassium 4.5 3.5 - 5.3 mmol/L    Chloride 105 98 - 107 mmol/L    Bicarbonate 27 21 - 32 mmol/L    Anion Gap 14 10 - 20 mmol/L    Urea Nitrogen 23 6 - 23 mg/dL    Creatinine 1.25 (H) 0.50 - 1.05 mg/dL    eGFR 41 (L) >60 mL/min/1.73m*2    Calcium 8.5 (L) 8.6 - 10.3 mg/dL    Phosphorus 4.1 2.5 - 4.9 mg/dL    Albumin 3.8 3.4 - 5.0 g/dL   Magnesium   Result Value Ref Range    Magnesium 1.70 1.60 - 2.40 mg/dL   Protime-INR   Result Value Ref Range    Protime 23.7 (H) 9.8 - 12.8 seconds    INR 2.1 (H) 0.9 - 1.1        Medications  Scheduled medications:  amitriptyline, 10 mg, oral, Nightly  levothyroxine, 100 mcg, oral, Daily  metoprolol tartrate, 50 mg,  oral, BID  pantoprazole, 40 mg, oral, Daily  valsartan, 160 mg, oral, Daily      Continuous medications:  sodium chloride 0.9%, 50 mL/hr, Last Rate: 50 mL/hr (12/08/23 1119)      PRN medications:  PRN medications: acetaminophen, morphine, morphine, ondansetron     Physical Exam  Constitutional:       General: She is not in acute distress.     Appearance: Normal appearance.      Comments: Elderly   HENT:      Head: Normocephalic and atraumatic.      Right Ear: External ear normal.      Left Ear: External ear normal.      Nose: Nose normal.      Mouth/Throat:      Mouth: Mucous membranes are moist.      Pharynx: Oropharynx is clear.   Eyes:      Extraocular Movements: Extraocular movements intact.      Conjunctiva/sclera: Conjunctivae normal.      Pupils: Pupils are equal, round, and reactive to light.   Cardiovascular:      Rate and Rhythm: Normal rate and regular rhythm.      Pulses: Normal pulses.      Heart sounds: Normal heart sounds.   Pulmonary:      Effort: Pulmonary effort is normal. No respiratory distress.      Breath sounds: Normal breath sounds. No wheezing, rhonchi or rales.   Abdominal:      General: Abdomen is flat. Bowel sounds are normal.      Palpations: Abdomen is soft.      Tenderness: There is no abdominal tenderness. There is no right CVA tenderness, left CVA tenderness, guarding or rebound.   Musculoskeletal:         General: Tenderness, deformity and signs of injury present. No swelling.      Cervical back: Normal range of motion and neck supple.      Comments: LLE shortened and externally rotated  L wrist in splint   Skin:     General: Skin is warm and dry.      Capillary Refill: Capillary refill takes less than 2 seconds.      Findings: No lesion or rash.   Neurological:      General: No focal deficit present.      Mental Status: She is alert and oriented to person, place, and time. Mental status is at baseline.   Psychiatric:         Mood and Affect: Mood normal.         Behavior: Behavior  normal.                  Code Status  Full Code     Assessment/Plan      Closed fracture of left hip (CMS/HCC)  Assessment & Plan  -Orthopedic consult appreciated  -Will hold home Coumadin at this time- resume per orthopedics  -Planning L IMN 12/8/23  -As needed morphine for analgesia and adjust accordingly  -PT/OT once weightbearing status is cleared by orthopedic surgery  -Further pain control per orthopedics    * Fall, initial encounter  Assessment & Plan  -Noted slight wrist fracture as well as left intertrochanteric fracture  -No syncopal episode, lightheadedness, dizziness or seizure-like activity  -PT/OT appreciated    CAD (coronary artery disease)  Assessment & Plan  -Continued on home medications     GERD (gastroesophageal reflux disease)  Assessment & Plan  -Continued on home medications     Left wrist fracture, closed, initial encounter  Assessment & Plan  -Patient has had a prior fracture and surgical repair in the past  -Per discussion between the ED physician and orthopedics this appears to be a buckle fracture with intact hardware and to place a Velcro splint.  -Orthopedic consult appreciated  -Continued management per orthopedics- cockup wrist splint with follow up in 2 weeks    Hypothyroidism  Assessment & Plan  -History of thyroid cancer s/p thyroidectomy  -Continued on home Synthroid    Hyperlipidemia  Assessment & Plan  -Continued on home medications    HTN (hypertension)  Assessment & Plan  -Continued on home medications    Depression, major, single episode, moderate (CMS/HCC)  Assessment & Plan  -Continued on home medications    Atrial fibrillation (CMS/HCC)  Assessment & Plan  -Irregularly irregular rate and rhythm on examination  -No signs of RVR on telemetry monitoring at this time  -INR level of 2.2 currently so we will hold evening dose of Coumadin and patient states that she has not taken any Coumadin earlier today  -Will repeat PT/INR in the morning  -If reversal is required then we will  default to orthopedic surgery per their discretion  -Continued on home metoprolol    Anxiety state  Assessment & Plan  -Continued on home medications          DVT ppx: Resume coumadin as per orthopedics     Please see orders for more complete plan    Irene Duran PA-C

## 2023-12-08 NOTE — ASSESSMENT & PLAN NOTE
-Orthopedic consult appreciated  -Will hold home Coumadin at this time- resume per orthopedics  -Planning L IMN 12/8/23  -As needed morphine for analgesia and adjust accordingly  -PT/OT once weightbearing status is cleared by orthopedic surgery  -Further pain control per orthopedics

## 2023-12-08 NOTE — OP NOTE
LEFT Hip Fracture open reduction internal fixation with nail (synthes) * C ARM* (L) Operative Note     Date: 2023 - 2023  OR Location: [facility]    Name: Lyn Turner, : 1932, Age: 89 y.o., MRN: [id number], Sex: female    Diagnosis  Pre-op Diagnosis     * Closed fracture of left hip, initial encounter (CMS/[facility]) [S72.002A] Post-op Diagnosis     * Closed fracture of left hip, initial encounter (CMS/[facility]) [S72.002A]     Procedures  LEFT Hip Fracture open reduction internal fixation with nail (synthes) * C ARM*  44049 - NC TX INTER/NC/SUBTRCHNTRIC FEM FX IMED IMPLTSCREW      Surgeons      * Leon Berry - Primary    Resident/Fellow/Other Assistant:  Surgeon(s) and Role:    Procedure Summary  Anesthesia: General  ASA: III  Anesthesia Staff: CRNA: JOSÉ Bolanos-CRNA  Estimated Blood Loss: 150mL  Intra-op Medications:   Medication Name Total Dose   BUPivacaine-EPINEPHrine (Marcaine w/EPI) 0.5 %-1:200,000 injection 10 mL   tranexamic acid (Cyklokapron) injection 1 g              Anesthesia Record               Intraprocedure I/O Totals       None           Specimen: No specimens collected     Staff:   Circulator: Tommie Patel RN  Scrub Person: Jaime Hendricks RN         Drains and/or Catheters: * None in log *    Tourniquet Times:         Implants:  Implants       Type Name Action Serial No.      Joint Hip NAIL, TFN ADV SHORT, 170MML, DIAMETER 11, 130 DEG - [id number] Implanted      Screw SCREW, TFNA, 110MM, STERILE - [id number] Implanted      Screw SCREW LOCKING 5.0 X 42MM TI - [id number] Implanted      Screw SCREW LOCKING 5.0 X 44MM TI - [id number] Wasted             Indications: Lyn Turner is an 89 y.o. female who is having surgery for Closed fracture of left hip, initial encounter (CMS/[facility]) [S72.002A].     The patient was seen in the preoperative area. The risks, benefits, complications, treatment options, non-operative alternatives, expected recovery and outcomes were discussed with  the patient. The possibilities of reaction to medication, pulmonary aspiration, injury to surrounding structures, bleeding, recurrent infection, the need for additional procedures, failure to diagnose a condition, and creating a complication requiring transfusion or operation were discussed with the patient. The patient concurred with the proposed plan, giving informed consent.  The site of surgery was properly noted/marked if necessary per policy. The patient has been actively warmed in preoperative area.    Procedure Details:     INDICATION FOR PROCEDURE: This patient is an 89 y.o. year old female and was seen in the ED with a left intertrochanteric femur fracture after a ground level fall. We had a discussion about treatment options including operative and non operative care. The patient elected for surgical fixation to allow for early mobilization. We talked about risks, benefits, complications, and alternatives to the procedure and prior to agreeing to proceed.    PROCEDURE IN DETAIL: The patient was seen in pre op and the extremity was marked. The consent was reviewed and the patient was taken to the operating suite and placed in the supine position on the fracture table. General endotracheal intubation was administered. At this point the patient was positioned with the operative leg and torso slightly adducted and the foot internally rotated. Closed reduction was performed under fluoroscopy which yielded anatomic alignment. The patient was prepped and draped in a sterile fashion and a time out was performed.    The greater trochanter was marked under fluoroscopy and a lateral incision was made just proximal and posterior to the trochanter. The guidewire was introduced and placed at the tip of the greater trochanter and at the midpoint on the lateral view. The wire was malleted into place. The opening reamer was used with a soft tissue guide. The 11 mm x 170 mm sarah was placed. After the nail was placed the  threaded guidewire was placed in a center-center position in the femoral head. This measured 115 mm. The near cortex was drilled and then we tapped over the wire. The 110 mm x 11 mm screw was placed and locked into position. We then turned our attention distally to the end of the sarah where a small incision was made with the drill guide. The near cortex was drilled under fluoroscopy and the bit was finished at the far cortex and a 42 mm locking screw placed. At this point we had achieved anatomic reduction and the wounds were irrigated and final films obtained.    The incisions were closed in a layered fashion using 0, 2-0, and staples for the skin. A soft sterile dressing was placed and the patient was taken to PACU without complications. The patient will be able to weight bear as tolerated and will follow up in the office in 2 weeks.    Electronically signed  Sb Berry DO

## 2023-12-08 NOTE — PROGRESS NOTES
Pharmacy Consult for Warfarin (Coumadin) Management - Daily Progress Note     Lyn Turner is a 89 y.o. female admitted for Fall, initial encounter. Pharmacy was consulted for warfarin dosing and monitoring.    Labs  INR   Date Value Ref Range Status   12/08/2023 1.9 (H) 0.9 - 1.1 Final   12/08/2023 2.1 (H) 0.9 - 1.1 Final   12/07/2023 2.2 (H) 0.9 - 1.1 Final     Review  Warfarin Indication: Atrial fibrillation or flutter  Target INR: 2 - 3     Home dose is 4 mg Sun/Tue/Thur and 2 mg all other days per [facility] note. Dr. Berry ordered 5 mg once on 12/8. Patient is currently subtherapeutic and did not get a dose here yesterday.  Orders placed per pharmacy consult. Pharmacy will continue to monitor and adjust therapy as needed.   [facility]

## 2023-12-08 NOTE — CARE PLAN
Progress Note, Physician


History of Present Illness: 





Confused and agitated today.








- Current Medication List


Current Medications: 


Active Medications





Acetaminophen (Tylenol -)  650 mg PO Q6H PRN


   PRN Reason: PAIN LEVEL 6-10


   Last Admin: 11/21/19 08:24 Dose:  650 mg


Clonazepam (Klonopin -)  2 mg PO Q8H PRN


   PRN Reason: ANXIETY


Finasteride (Proscar -)  5 mg PO DAILY [facility]


   Last Admin: 11/21/19 10:08 Dose:  5 mg


Vancomycin HCl (Vancomycin (Pre-Docked))  1,000 mg in 250 mls @ 166.667 mls/hr 

IVPB BID@0600,1800 [facility]; Protocol


   Last Admin: 11/21/19 06:32 Dose:  166.667 mls/hr


Tigecycline 50 mg/ Dextrose  100 mls @ 100 mls/hr IVPB BID [facility]; Protocol


   Last Admin: 11/21/19 10:06 Dose:  100 mls/hr


Lorazepam (Ativan -)  2 mg PO BID [facility]


   Last Admin: 11/21/19 10:00 Dose:  Not Given


Metoprolol Succinate (Toprol Xl -)  12.5 mg PO DAILY [facility]


   Last Admin: 11/21/19 10:08 Dose:  12.5 mg


Olanzapine (Zyprexa -)  7.5 mg PO [facility]


   Last Admin: 11/20/19 22:13 Dose:  7.5 mg


Potassium Chloride (K-Dur -)  40 meq PO DAILY [facility]


   Last Admin: 11/21/19 10:08 Dose:  40 meq


Rivaroxaban (Xarelto)  15 mg PO DAILY@1800 [facility]


   Last Admin: 11/20/19 17:26 Dose:  15 mg


Rosuvastatin Calcium (Crestor -)  5 mg PO [facility]


   Last Admin: 11/20/19 22:13 Dose:  5 mg


Tamsulosin HCl (Flomax -)  0.4 mg PO DAILY@0830 [facility]


   Last Admin: 11/21/19 08:24 Dose:  0.4 mg











- Objective


Vital Signs: 


 Vital Signs











Temperature  99.4 F   11/21/19 06:00


 


Pulse Rate  68   11/21/19 09:07


 


Respiratory Rate  18   11/21/19 09:07


 


Blood Pressure  128/55 L  11/21/19 09:07


 


O2 Sat by Pulse Oximetry (%)  99   11/20/19 21:00











Constitutional: Yes: No Distress, Calm


Neck: Yes: Supple


Cardiovascular: Yes: Pulse Irregular


Respiratory: Yes: Regular, Diminished


Gastrointestinal: Yes: Normal Bowel Sounds, Soft


Genitourinary: Yes: Berry Present


Extremities: Yes: Erythema


Edema: No


Integumentary: Yes: Venous Stasis Changes


Labs: 


 CBC, BMP





 11/20/19 07:35 





 11/20/19 07:35 











Problem List





- Problems


(1) Allergy to multiple antibiotics


Code(s): Z88.1 - ALLERGY STATUS TO OTHER ANTIBIOTIC AGENTS STATUS   





(2) Anxiety


Code(s): F41.9 - ANXIETY DISORDER, UNSPECIFIED   





(3) Atrial fibrillation


Code(s): I48.91 - UNSPECIFIED ATRIAL FIBRILLATION   


Qualifiers: 


   Atrial fibrillation type: chronic 





(4) Cellulitis


Code(s): L03.90 - CELLULITIS, UNSPECIFIED   


Qualifiers: 


   Site of cellulitis: extremity   Site of cellulitis of extremity: lower 

extremity 





(5) Chronic anticoagulation


Code(s): Z79.01 - LONG TERM (CURRENT) USE OF ANTICOAGULANTS   





(6) Hypertension


Code(s): I10 - ESSENTIAL (PRIMARY) HYPERTENSION   


Qualifiers: 


   Hypertension type: essential hypertension   Qualified Code(s): I10 - 

Essential (primary) hypertension   





(7) Lymphedema


Code(s): I89.0 - LYMPHEDEMA, NOT ELSEWHERE CLASSIFIED   





(8) Pacemaker


Code(s): Z95.0 - PRESENCE OF CARDIAC PACEMAKER   





Assessment/Plan


ECHO 3/2018 showed normal LV systolic function, mild AS no other sig valvular 

abnl





1. Recurrent LE cellulitis with multiple allergies to PCN and carbipenem 

improving


2. Chronic venous stasis dermatitis


3. Acute on CKD with hx of hydronephrosis/obstructive uropathy likely pre-renal 

improving


4. Chronic diastolic heart failure


5. HTN cardiomyopathy


6. Afib on Xarelto


7. sss s/p PPM


8. OSAS


9. CAD h/o demand ischemia


10. Hyperlipidemia


11. Anxiety d/o


12. BPH


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





P:1. Empiric abx per C&S, wound care


2. Hold oral diuretics and zaroxolyn pending renal fxn recovery, monitor 

electrolytes, wrap legs


3. Continue Toprol XL 12.5 qd, Xarelto 15 qd,  Crestor 5 qd, hold losartan 

pending renal fxn stabilization


4. Eventual rehab then follow up in office [phone]


5. DVT and GI prophylaxis The patient's goals for the shift include    Problem: Skin  Goal: Decreased wound size/increased tissue granulation at next dressing change  Outcome: Progressing  Flowsheets (Taken 12/8/2023 1527)  Decreased wound size/increased tissue granulation at next dressing change: Protective dressings over bony prominences  Goal: Participates in plan/prevention/treatment measures  Outcome: Progressing  Flowsheets (Taken 12/8/2023 1527)  Participates in plan/prevention/treatment measures:   Elevate heels   Discuss with provider PT/OT consult  Goal: Prevent/manage excess moisture  Outcome: Progressing  Flowsheets (Taken 12/8/2023 1527)  Prevent/manage excess moisture: Monitor for/manage infection if present  Goal: Prevent/minimize sheer/friction injuries  Outcome: Progressing  Flowsheets (Taken 12/8/2023 1527)  Prevent/minimize sheer/friction injuries:   HOB 30 degrees or less   Turn/reposition every 2 hours/use positioning/transfer devices  Goal: Promote/optimize nutrition  Outcome: Progressing  Flowsheets (Taken 12/8/2023 1527)  Promote/optimize nutrition: Monitor/record intake including meals  Goal: Promote skin healing  Outcome: Progressing  Flowsheets (Taken 12/8/2023 1527)  Promote skin healing: Assess skin/pad under line(s)/device(s)     Problem: Pain  Goal: Takes deep breaths with improved pain control throughout the shift  Outcome: Progressing  Goal: Turns in bed with improved pain control throughout the shift  Outcome: Progressing  Goal: Walks with improved pain control throughout the shift  Outcome: Progressing  Goal: Performs ADL's with improved pain control throughout shift  Outcome: Progressing  Goal: Participates in PT with improved pain control throughout the shift  Outcome: Progressing  Goal: Free from opioid side effects throughout the shift  Outcome: Progressing  Goal: Free from acute confusion related to pain meds throughout the shift  Outcome: Progressing     Problem: Safety  Goal: I will remain free of  falls  Outcome: Progressing     Problem: Daily Care  Goal: Daily care needs are met  Outcome: Progressing     Problem: Psychosocial Needs  Goal: Demonstrates ability to cope with hospitalization/illness  Outcome: Progressing  Goal: Collaborate with me, my family, and caregiver to identify my specific goals  Outcome: Progressing     Problem: Discharge Barriers  Goal: My discharge needs are met  Outcome: Progressing

## 2023-12-08 NOTE — PROGRESS NOTES
Physical Therapy                 Therapy Communication Note    Patient Name: Lyn Turner  MRN: [id number]  Today's Date: 12/8/2023     Discipline: Physical Therapy    Missed Visit Reason: Missed Visit Reason: Patient in a medical procedure, Patient placed on medical hold (Patient admitted with hip fx (and wrist fx?), hold for surgery, reattempt tomorrow.)    Missed Time: Attempt    Comment:

## 2023-12-09 LAB
ANION GAP SERPL CALC-SCNC: 12 MMOL/L (ref 10–20)
BUN SERPL-MCNC: 27 MG/DL (ref 6–23)
CALCIUM SERPL-MCNC: 7.9 MG/DL (ref 8.6–10.3)
CHLORIDE SERPL-SCNC: 105 MMOL/L (ref 98–107)
CO2 SERPL-SCNC: 26 MMOL/L (ref 21–32)
CREAT SERPL-MCNC: 1.33 MG/DL (ref 0.5–1.05)
ERYTHROCYTE [DISTWIDTH] IN BLOOD BY AUTOMATED COUNT: 16.2 % (ref 11.5–14.5)
GFR SERPL CREATININE-BSD FRML MDRD: 38 ML/MIN/1.73M*2
GLUCOSE SERPL-MCNC: 157 MG/DL (ref 74–99)
HCT VFR BLD AUTO: 26.5 % (ref 36–46)
HGB BLD-MCNC: 8.4 G/DL (ref 12–16)
INR PPP: 2.2 (ref 0.9–1.1)
MAGNESIUM SERPL-MCNC: 1.68 MG/DL (ref 1.6–2.4)
MCH RBC QN AUTO: 29.8 PG (ref 26–34)
MCHC RBC AUTO-ENTMCNC: 31.7 G/DL (ref 32–36)
MCV RBC AUTO: 94 FL (ref 80–100)
NRBC BLD-RTO: 0 /100 WBCS (ref 0–0)
PLATELET # BLD AUTO: 130 X10*3/UL (ref 150–450)
POTASSIUM SERPL-SCNC: 4.2 MMOL/L (ref 3.5–5.3)
PROTHROMBIN TIME: 24.6 SECONDS (ref 9.8–12.8)
RBC # BLD AUTO: 2.82 X10*6/UL (ref 4–5.2)
SODIUM SERPL-SCNC: 139 MMOL/L (ref 136–145)
WBC # BLD AUTO: 10.3 X10*3/UL (ref 4.4–11.3)

## 2023-12-09 PROCEDURE — 2500000004 HC RX 250 GENERAL PHARMACY W/ HCPCS (ALT 636 FOR OP/ED): Performed by: ORTHOPAEDIC SURGERY

## 2023-12-09 PROCEDURE — 83735 ASSAY OF MAGNESIUM: CPT | Performed by: ORTHOPAEDIC SURGERY

## 2023-12-09 PROCEDURE — 97161 PT EVAL LOW COMPLEX 20 MIN: CPT | Mod: GP

## 2023-12-09 PROCEDURE — 1200000002 HC GENERAL ROOM WITH TELEMETRY DAILY

## 2023-12-09 PROCEDURE — 2500000004 HC RX 250 GENERAL PHARMACY W/ HCPCS (ALT 636 FOR OP/ED): Performed by: INTERNAL MEDICINE

## 2023-12-09 PROCEDURE — 97165 OT EVAL LOW COMPLEX 30 MIN: CPT | Mod: GO | Performed by: OCCUPATIONAL THERAPIST

## 2023-12-09 PROCEDURE — 2500000001 HC RX 250 WO HCPCS SELF ADMINISTERED DRUGS (ALT 637 FOR MEDICARE OP): Performed by: PHYSICIAN ASSISTANT

## 2023-12-09 PROCEDURE — 85610 PROTHROMBIN TIME: CPT | Performed by: ORTHOPAEDIC SURGERY

## 2023-12-09 PROCEDURE — 80048 BASIC METABOLIC PNL TOTAL CA: CPT | Performed by: ORTHOPAEDIC SURGERY

## 2023-12-09 PROCEDURE — 85027 COMPLETE CBC AUTOMATED: CPT | Performed by: ORTHOPAEDIC SURGERY

## 2023-12-09 PROCEDURE — 2500000001 HC RX 250 WO HCPCS SELF ADMINISTERED DRUGS (ALT 637 FOR MEDICARE OP): Performed by: INTERNAL MEDICINE

## 2023-12-09 PROCEDURE — 97530 THERAPEUTIC ACTIVITIES: CPT | Mod: GP

## 2023-12-09 PROCEDURE — 99233 SBSQ HOSP IP/OBS HIGH 50: CPT | Performed by: INTERNAL MEDICINE

## 2023-12-09 PROCEDURE — 2500000001 HC RX 250 WO HCPCS SELF ADMINISTERED DRUGS (ALT 637 FOR MEDICARE OP): Performed by: ORTHOPAEDIC SURGERY

## 2023-12-09 RX ORDER — LORAZEPAM 0.5 MG/1
0.5 TABLET ORAL EVERY 6 HOURS PRN
Status: DISCONTINUED | OUTPATIENT
Start: 2023-12-09 | End: 2023-12-11

## 2023-12-09 RX ORDER — WARFARIN 2 MG/1
4 TABLET ORAL
Status: DISCONTINUED | OUTPATIENT
Start: 2023-12-10 | End: 2023-12-11

## 2023-12-09 RX ORDER — HALOPERIDOL 5 MG/ML
2 INJECTION INTRAMUSCULAR ONCE
Status: COMPLETED | OUTPATIENT
Start: 2023-12-09 | End: 2023-12-09

## 2023-12-09 RX ORDER — WARFARIN 2 MG/1
2 TABLET ORAL
Status: DISCONTINUED | OUTPATIENT
Start: 2023-12-09 | End: 2023-12-11

## 2023-12-09 RX ADMIN — LEVOTHYROXINE SODIUM 100 MCG: 0.1 TABLET ORAL at 06:44

## 2023-12-09 RX ADMIN — MIRTAZAPINE 15 MG: 15 TABLET, FILM COATED ORAL at 20:59

## 2023-12-09 RX ADMIN — PANTOPRAZOLE SODIUM 40 MG: 40 TABLET, DELAYED RELEASE ORAL at 10:37

## 2023-12-09 RX ADMIN — CEFAZOLIN SODIUM 2 G: 2 INJECTION, SOLUTION INTRAVENOUS at 03:59

## 2023-12-09 RX ADMIN — METOPROLOL TARTRATE 50 MG: 50 TABLET, FILM COATED ORAL at 21:00

## 2023-12-09 RX ADMIN — MORPHINE SULFATE 2 MG: 2 INJECTION, SOLUTION INTRAMUSCULAR; INTRAVENOUS at 04:00

## 2023-12-09 RX ADMIN — CYANOCOBALAMIN TAB 500 MCG 500 MCG: 500 TAB at 10:37

## 2023-12-09 RX ADMIN — METOPROLOL TARTRATE 50 MG: 50 TABLET, FILM COATED ORAL at 10:38

## 2023-12-09 RX ADMIN — SODIUM CHLORIDE 50 ML/HR: 9 INJECTION, SOLUTION INTRAVENOUS at 14:55

## 2023-12-09 RX ADMIN — WARFARIN SODIUM 2 MG: 2 TABLET ORAL at 17:32

## 2023-12-09 RX ADMIN — HALOPERIDOL LACTATE 2 MG: 5 INJECTION, SOLUTION INTRAMUSCULAR at 23:25

## 2023-12-09 RX ADMIN — AMITRIPTYLINE HYDROCHLORIDE 10 MG: 10 TABLET, FILM COATED ORAL at 20:59

## 2023-12-09 RX ADMIN — ACETAMINOPHEN 650 MG: 325 TABLET ORAL at 16:08

## 2023-12-09 ASSESSMENT — COGNITIVE AND FUNCTIONAL STATUS - GENERAL
DAILY ACTIVITIY SCORE: 13
DRESSING REGULAR UPPER BODY CLOTHING: A LOT
MOVING FROM LYING ON BACK TO SITTING ON SIDE OF FLAT BED WITH BEDRAILS: A LOT
STANDING UP FROM CHAIR USING ARMS: A LOT
PERSONAL GROOMING: A LOT
TURNING FROM BACK TO SIDE WHILE IN FLAT BAD: A LOT
TOILETING: A LOT
TOILETING: A LOT
MOVING FROM LYING ON BACK TO SITTING ON SIDE OF FLAT BED WITH BEDRAILS: A LITTLE
MOVING FROM LYING ON BACK TO SITTING ON SIDE OF FLAT BED WITH BEDRAILS: A LITTLE
EATING MEALS: A LITTLE
HELP NEEDED FOR BATHING: A LOT
HELP NEEDED FOR BATHING: A LOT
TURNING FROM BACK TO SIDE WHILE IN FLAT BAD: A LITTLE
MOBILITY SCORE: 16
MOVING TO AND FROM BED TO CHAIR: A LITTLE
PERSONAL GROOMING: A LITTLE
CLIMB 3 TO 5 STEPS WITH RAILING: A LOT
CLIMB 3 TO 5 STEPS WITH RAILING: A LOT
EATING MEALS: A LITTLE
PERSONAL GROOMING: A LITTLE
DAILY ACTIVITIY SCORE: 12
WALKING IN HOSPITAL ROOM: A LITTLE
MOBILITY SCORE: 16
STANDING UP FROM CHAIR USING ARMS: A LOT
WALKING IN HOSPITAL ROOM: A LITTLE
DRESSING REGULAR LOWER BODY CLOTHING: TOTAL
DRESSING REGULAR UPPER BODY CLOTHING: A LOT
DRESSING REGULAR LOWER BODY CLOTHING: TOTAL
TURNING FROM BACK TO SIDE WHILE IN FLAT BAD: A LITTLE
EATING MEALS: A LITTLE
CLIMB 3 TO 5 STEPS WITH RAILING: TOTAL
MOVING TO AND FROM BED TO CHAIR: A LITTLE
DAILY ACTIVITIY SCORE: 13
MOVING TO AND FROM BED TO CHAIR: A LOT
HELP NEEDED FOR BATHING: A LOT
MOBILITY SCORE: 11
STANDING UP FROM CHAIR USING ARMS: A LOT
WALKING IN HOSPITAL ROOM: A LOT
DRESSING REGULAR UPPER BODY CLOTHING: A LOT
DRESSING REGULAR LOWER BODY CLOTHING: TOTAL
TOILETING: A LOT

## 2023-12-09 ASSESSMENT — PAIN - FUNCTIONAL ASSESSMENT
PAIN_FUNCTIONAL_ASSESSMENT: 0-10

## 2023-12-09 ASSESSMENT — ACTIVITIES OF DAILY LIVING (ADL): ADLS_ADDRESSED: YES

## 2023-12-09 ASSESSMENT — PAIN SCALES - GENERAL
PAINLEVEL_OUTOF10: 0 - NO PAIN
PAINLEVEL_OUTOF10: 0 - NO PAIN
PAINLEVEL_OUTOF10: 3
PAINLEVEL_OUTOF10: 7

## 2023-12-09 NOTE — PROGRESS NOTES
Occupational Therapy    Evaluation    Patient Name: Lyn Turner  MRN: [id number]  Today's Date: 12/9/2023  Time Calculation  Start Time: 1159  Stop Time: 1219  Time Calculation (min): 20 min        Assessment:  OT Assessment: Pt is a 89 y/o F who presents to this facility s/p fall resultant in L hip fx and L distal radius sprain. Pt with performance deficits in strength, cognition, balance, and functional activity tolerance limiting ability to complete ADL and IADL tasks. Pt would benefit from skilled OT services.  Prognosis: Good  Evaluation/Treatment Tolerance: Patient tolerated treatment well  Medical Staff Made Aware: Yes  End of Session Patient Position: Up in chair, Alarm on (Daughter present)  Prognosis: Good  Evaluation/Treatment Tolerance: Patient tolerated treatment well  Medical Staff Made Aware: Yes  Plan:  Treatment Interventions: ADL retraining, Functional transfer training, UE strengthening/ROM, Endurance training, Patient/family training, Equipment evaluation/education, Compensatory technique education  OT Frequency: 4 times per week  OT Discharge Recommendations: Moderate intensity level of continued care  OT - OK to Discharge: Yes  Treatment Interventions: ADL retraining, Functional transfer training, UE strengthening/ROM, Endurance training, Patient/family training, Equipment evaluation/education, Compensatory technique education    Subjective   Current Problem:  1. Closed fracture of left hip, initial encounter (CMS/[facility])  Case Request Operating Room: Hip Fracture open reduction internal fixation with nail    Case Request Operating Room: Hip Fracture open reduction internal fixation with nail    s/p ORIF, supportive care, may need SNF placement, need casemanagement      2. Fall, initial encounter        3. History of anticoagulant therapy      monitor INR/PTT      4. Primary hypertension      continue metoprolol      5. Atrial fibrillation, unspecified type (CMS/[facility])      monitor INR and adjust  coumadin as indicated      6. Hypothyroidism, unspecified type      continue synthroid      7. Cognitive disorder      need family memhber for supportive care.        General:  General  Reason for Referral: Pt presents to this facility s/p fall resultant in L hip fx s/p L hip ORIF. L wrist sprain with thumb spica splint in place.  Referred By: Dr. Berry  Past Medical History Relevant to Rehab: past medical history significant for HTN, HLD, CAD, atrial fibrillation on Coumadin, HFpEF, s/p PPM, breast cancer s/p chemo/radiation/left mastectomy, thyroid cancer s/p thyroidectomy, hypothyroidism, anxiety, depression and GERD.  Co-Treatment: PT  Co-Treatment Reason: Unknown level of assistance with ADL and functional transfers. Maximize safety with functional tasks.  Prior to Session Communication: Bedside nurse  Patient Position Received: Up in chair, Alarm on  General Comment: Pts daughter present in room.  Precautions:  Medical Precautions: Fall precautions     Pain:  Pain Assessment  Pain Assessment:  (Pt reporting back pain, however does not state number of pain.)    Objective   Cognition:  Overall Cognitive Status: Impaired  Orientation Level:  (Pt only asked name, pt stating she was told she was confused yesterday, however does not feel this is accurate. Pts daughter confirms she thought she was at a restaurant yesterday.)  Memory:  (Pt with decreased ability to state events of fall, daughter in room providing minimal corrections.)  Safety/Judgement: Exceptions to WFL  Other (Comment): Dec  Impulsive: Mildly  Planning: Reduced planning skills           Home Living:  Home Living Comments: Pt reporting she lives in one level home with 1 TOD and 1 step within home. Pt has walk in shower with rails, no chair. BSC over toilet. RW and SPC, stating she does not use either, however SPC noted in pts room.  Prior Function:  Prior Function Comments: Pr reporting IND with ADL tasks. Mostly IND with IADL tasks, however has  family assistance as needed.     ADL:  Toileting Assistance with Device: Moderate  ADL Comments: Pt seated in recliner chair upon arrival, agreeable to OT evaluation. Education provided to pt regarding reacher and sock aid, however pt would benefit from demonstration and practice for LBD. Pt completed sit to stand transfers with MOD A. Functional amb with MIN A into bathroom. Toileting routine completed with MOD A, assist with transfers only. Pt able to complete gloria-care and CM with CGA.  Activity Tolerance:  Endurance: Tolerates 10 - 20 min exercise with multiple rests  Bed Mobility/Transfers: Bed Mobility  Bed Mobility: No    Transfers  Transfer: Yes  Transfer 1  Transfer From 1:  (Chair with arms)  Technique 1: Sit to stand, Stand to sit  Transfer Device 1: Walker  Transfer Level of Assistance 1: Moderate assistance  Trials/Comments 1: MOD VC for hand placement  Transfers 2  Transfer From 2:  (toilet)  Technique 2: Sit to stand, Stand to sit  Transfer Device 2: Walker  Transfer Level of Assistance 2: Moderate assistance  Trials/Comments 2: MOD VC for hand placement      Ambulation/Gait Training:  Ambulation/Gait Training  Ambulation/Gait Training Performed: Yes  Ambulation/Gait Training 1  Comments/Distance (ft) 1: Pt ambulated household distances using RW with MIN A x1.  Sitting Balance:  Static Sitting Balance  Static Sitting-Comment/Number of Minutes: CGA  Dynamic Sitting Balance  Dynamic Sitting-Comments: CGA  Standing Balance:  Static Standing Balance  Static Standing-Comment/Number of Minutes: MIN A  Dynamic Standing Balance  Dynamic Standing-Comments: MOD A during transfers   Strength:  Strength Comments: LUE with thumb spica splint on. BUEs shoulder flexion impairment at baseline, approx 90*.  Outcome Measures:[facility] Daily Activity  Putting on and taking off regular lower body clothing: Total  Bathing (including washing, rinsing, drying): A lot  Putting on and taking off regular upper body clothing: A  lot  Toileting, which includes using toilet, bedpan or urinal: A lot  Taking care of personal grooming such as brushing teeth: A little  Eating Meals: A little  Daily Activity - Total Score: 13        Education Documentation  Body Mechanics, taught by Monserrat Pulliam OT at 12/9/2023  2:34 PM.  Learner: Patient  Readiness: Acceptance  Method: Explanation  Response: Demonstrated Understanding, Needs Reinforcement    Precautions, taught by Monserrat Pulliam OT at 12/9/2023  2:34 PM.  Learner: Patient  Readiness: Acceptance  Method: Explanation  Response: Demonstrated Understanding, Needs Reinforcement    ADL Training, taught by Monserrat Pulliam OT at 12/9/2023  2:34 PM.  Learner: Patient  Readiness: Acceptance  Method: Explanation  Response: Demonstrated Understanding, Needs Reinforcement    Education Comments  No comments found.        OP EDUCATION:  Education  Individual(s) Educated: Patient  Education Provided: Diagnosis & Precautions, Symptom management, Fall precautons, Risk and benefits of OT discussed with patient or other, POC discussed and agreed upon  Risk and Benefits Discussed with Patient/Caregiver/Other: yes  Patient/Caregiver Demonstrated Understanding: yes  Plan of Care Discussed and Agreed Upon: yes  Patient Response to Education: Patient/Caregiver Verbalized Understanding of Information    Goals:  Encounter Problems       Encounter Problems (Active)       OT Goals       Pt will complete ADL transfers with CGA.  (Progressing)       Start:  12/09/23    Expected End:  12/23/23            Pt will complete toileting routine with MIN A. (Progressing)       Start:  12/09/23    Expected End:  12/23/23            Pt will complete LBD tasks using AE/DME with MIN A.  (Progressing)       Start:  12/09/23    Expected End:  12/23/23            Pt will demo increased functional activity tolerance to G as needed to complete ADL tasks.  (Progressing)       Start:  12/09/23    Expected End:  12/23/23

## 2023-12-09 NOTE — PROGRESS NOTES
Physical Therapy    Physical Therapy Evaluation    Patient Name: Lyn Turner  MRN: [id number]  Today's Date: 12/9/2023        Assessment/Plan   PT Assessment  PT Assessment Results: Decreased strength, Decreased range of motion, Decreased endurance, Impaired balance, Decreased cognition, Impaired judgement, Decreased safety awareness, Decreased mobility, Pain  Rehab Prognosis: Good  Assessment Comment: Pt presents with decreased strength, endurance and balance. She has mild impulsivity, decreased safety awareness and memory deficits. Skilled PT to address limitations inhibiting safety and independence with functional mobility, transfers and ambulation.  End of Session Patient Position: Up in chair, Alarm off, not on at start of session, Alarm off, caregiver present  IP OR SWING BED PT PLAN  Inpatient or Swing Bed: Inpatient  PT Plan  Treatment/Interventions: Bed mobility, Transfer training, Gait training, Stair training, Balance training, Neuromuscular re-education, Strengthening, Endurance training, Range of motion, Therapeutic exercise, Therapeutic activity  PT Plan: Skilled PT  PT Frequency: 5 times per week  PT Discharge Recommendations: Moderate intensity level of continued care      Subjective   General Visit Information:  General  Reason for Referral: Pt presents to this facility s/p fall resultant in L hip fx s/p L hip ORIF. L wrist sprain with thumb spica splint in place.  Referred By: Dr. Berry  Past Medical History Relevant to Rehab: past medical history significant for HTN, HLD, CAD, atrial fibrillation on Coumadin, HFpEF, s/p PPM, breast cancer s/p chemo/radiation/left mastectomy, thyroid cancer s/p thyroidectomy, hypothyroidism, anxiety, depression and GERD.  Patient Position Received: Up in chair, Alarm on  General Comment: Pts daughter present in room.  Home Living:  Home Living  Type of Home: House  Lives With: Alone  Home Layout: One level (with exception of one step in living room that she had  fallen on)  Home Access: Level entry  Prior Level of Function:  Prior Function Per Pt/Caregiver Report  Level of [place]: Independent with ADLs and functional transfers, Independent with homemaking with ambulation  Prior Function Comments: pt has SPC, walker and rollator at home  Precautions:  Precautions  LE Weight Bearing Status: Other (Comment) (Left WBAT)  Medical Precautions: Fall precautions      Objective   Pain:  Pain Assessment  Pain Assessment: 0-10  Pain Score: 0 - No pain (Pt endorses lower back and knee pain but no post op hip pain)  Cognition:  Cognition  Overall Cognitive Status: Impaired  Orientation Level:  (Pt only asked name, pt stating she was told she was confused yesterday, however does not feel this is accurate. Pts daughter confirms she thought she was at a restaurant yesterday.)  Memory: Exceptions to WFL  Short-Term Memory: Impaired (Trial gait with SPC and FWW; pt inquires about walking with cane later in evaluation after such had already been completed)  Memory Comments: Pt has difficulty recollecting seriesof events at home, timeline between falls at home. Daughter is present to assist.  Safety/Judgement: Exceptions to WFL  Other (Comment): Decreased awareness of need for assistance  Impulsive: Mildly  Planning: Reduced planning skills    General Assessments:  General Observation  General Observation: pt is cooperative and pleasant to evaluation. She is slightly confused and mildly impulsive, so needs continuous assistance and cueing for safety. Room 3325            Activity Tolerance  Endurance: Tolerates 10 - 20 min exercise with multiple rests  Functional Assessments:  ADL  ADL's Addressed: Yes  Toileting Assistance with Device: Moderate  Toileting Deficit: Grab bar use, Verbal cueing, Steadying  ADL Comments: SBA for hand washing following toileting    Bed Mobility  Bed Mobility: No (Pt up in chair at start of session and requested to stay in the chair following  evaluation)    Transfers  Transfer: Yes  Transfer 1  Technique 1: Sit to stand, Stand to sit  Transfer Device 1: Walker  Transfer Level of Assistance 1: Moderate verbal cues, Moderate assistance (for hand placement on chair arms for safety)  Transfers 2  Technique 2: Sit to stand, Stand to sit  Transfer Device 2: Cane  Transfer Level of Assistance 2: Moderate assistance    Ambulation/Gait Training  Ambulation/Gait Training Performed: Yes  Ambulation/Gait Training 1  Surface 1: Level tile  Device 1: Single point cane  Assistance 1: Moderate assistance (RUE; unsteadiness and difficulty with 3-point gait pattern)  Quality of Gait 1: Antalgic, Decreased step length, Inconsistent stride length (LLE)  Comments/Distance (ft) 1: 10ft  Ambulation/Gait Training 2  Surface 2: Level tile  Device 2: Rolling walker  Assistance 2: Minimum assistance (improved steadiness)  Quality of Gait 2: Antalgic (less compared to gait c SPC)  Comments/Distance (ft) 2: 40ft (within room)  Extremity/Trunk Assessments:  RLE   RLE : Within Functional Limits  LLE   LLE : Exceptions to WFL (Limited L hip post-op)  Outcome Measures:  [facility] Basic Mobility  Turning from your back to your side while in a flat bed without using bedrails: A little  Moving from lying on your back to sitting on the side of a flat bed without using bedrails: A little  Moving to and from bed to chair (including a wheelchair): A little  Standing up from a chair using your arms (e.g. wheelchair or bedside chair): A lot  To walk in hospital room: A little  Climbing 3-5 steps with railing: A lot  Basic Mobility - Total Score: 16    Encounter Problems       Encounter Problems (Active)       Mobility       STG - Patient will ambulate 70 ft using FWW with SBA (Progressing)       Start:  12/09/23    Expected End:  12/23/23            STG - Patient will ambulate up and down a curb/step Mod A x1 (Progressing)       Start:  12/09/23    Expected End:  12/23/23               Transfers        STG - Patient to transfer to and from sit to supine min A x1 (Progressing)       Start:  12/09/23    Expected End:  12/23/23            STG - Patient will transfer sit to and from stand safely with SBA (Progressing)       Start:  12/09/23    Expected End:  12/23/23                   Education Documentation  Mobility Training, taught by Rafaela Nina, PT at 12/9/2023  2:41 PM.  Learner: Family, Patient  Readiness: Acceptance  Method: Explanation  Response: Needs Reinforcement    Education Comments  No comments found.

## 2023-12-09 NOTE — PROGRESS NOTES
Pharmacy Consult for Warfarin (Coumadin) Management - Daily Progress Note     Lyn Turner is a 89 y.o. female admitted for Fall, initial encounter. Pharmacy was consulted for warfarin dosing and monitoring.     Labs  INR   Date Value Ref Range Status   12/09/2023 2.2 (H) 0.9 - 1.1 Final   12/08/2023 1.9 (H) 0.9 - 1.1 Final   12/08/2023 2.1 (H) 0.9 - 1.1 Final     Review  Warfarin Indication: Atrial fibrillation or flutter  Target INR: 2 - 3     Ordered home regimen of warfarin 4 mg Sun/Tue/Thur and 2 mg Mon/Wed/Fri/Sat  Orders placed per pharmacy consult. Pharmacy will continue to monitor and adjust therapy as needed.   Herminio Lew [facility]

## 2023-12-09 NOTE — PROGRESS NOTES
Lyn Turner is a 89 y.o. female on day 1 of admission presenting with Fall, initial encounter.      Subjective   Lyn Turner is a 89 y.o.  female with a past medical history significant for HTN, HLD, CAD, atrial fibrillation on Coumadin, HFpEF, s/p PPM, breast cancer s/p chemo/radiation/left mastectomy, thyroid cancer s/p thyroidectomy, hypothyroidism, anxiety, depression and GERD who presented 12/7/23 after a mechanical fall.  She states that she was walking from her kitchen into her living room where there is a small step and after she stepped down she was trying to talk to her family member who was behind her and when she turned she fell back landing on her left forearm as well as left hip.  She did not hit her head or lose consciousness.  She states that the step is maybe a 4 inch or less step between the 2 areas. Patient states that she has had prior wrist surgeries and about 2 to 3 years ago had actually broken her wrist on the left side.  Patient states that she has not taken any Coumadin today. Urinalysis was grossly unremarkable for any infectious etiologies. CT lumbar noted a left proximal femoral fracture, no acute abnormalities, degenerative changes.  XR left hip: Acute left angulated/displaced intertrochanteric fracture with displacement of the trochanters.  XR left wrist/forearm: Noted nearly seen subtle difference in appearance of the distal radius potentially due to impaction fracture versus difference in positioning. The ED physician did discuss case with the on-call orthopedic surgeon who had stated the forearm had a likely buckle fracture with intact hardware from prior surgical intervention and that they planned on taking the patient to the OR for surgical repair of the hip.     12/08/23: No acute events overnight. Vitals stable. Labs largely unchanged. INR 2.1. Hgb 10.1 (11.1). Going to OR for L IMN today    12/9/2023: No acute events overnight. S/p left hip ORIF on 12/8/2023  pod#1. Patient tolerated procedure well and no acute complains of chest pain/shortness of breath/nausea/vomiting. Patient had cognitive abnormalities that she was not aware of the surgery and demanded to go home right away. I ordered ativan for possible agitation. Patient need close family member to comfort.       Objective     Last Recorded Vitals  /72 (BP Location: Right arm, Patient Position: Lying)   Pulse 104   Temp 36.9 °C (98.4 °F) (Temporal)   Resp 16   Wt 60.9 kg (134 lb 4.2 oz)   SpO2 95%   Intake/Output last 3 Shifts:    Intake/Output Summary (Last 24 hours) at 12/9/2023 1030  Last data filed at 12/9/2023 0440  Gross per 24 hour   Intake 964.17 ml   Output 150 ml   Net 814.17 ml       Admission Weight  Weight: 60.9 kg (134 lb 4.2 oz) (12/07/23 1629)    Daily Weight  12/07/23 : 60.9 kg (134 lb 4.2 oz)    Image Results  FL less than 1 hour  These images are not reportable by radiology and will not be interpreted   by  Radiologists.      Physical Exam  Constitutional:       General: She is not in acute distress.     Appearance: Normal appearance.   HENT:      Head: Normocephalic.      Mouth/Throat:      Mouth: Mucous membranes are moist.      Pharynx: Oropharynx is clear.   Eyes:      Pupils: Pupils are equal, round, and reactive to light.   Cardiovascular:      Rate and Rhythm: Normal rate and regular rhythm.      Heart sounds: Normal heart sounds. No murmur heard.     No gallop.   Pulmonary:      Effort: Pulmonary effort is normal.      Breath sounds: Normal breath sounds.   Abdominal:      General: Bowel sounds are normal. There is no distension.      Palpations: Abdomen is soft.      Tenderness: There is no abdominal tenderness.   Musculoskeletal:         General: No swelling.      Cervical back: Neck supple. No rigidity.      Comments: Left hip surgery wound dressed well, no drain.   Skin:     General: Skin is warm and dry.   Neurological:      General: No focal deficit present.      Mental  Status: She is alert.      Cranial Nerves: No cranial nerve deficit.      Sensory: No sensory deficit.   Psychiatric:      Comments: Some agitation /confusion. She is not aware of the surgery and medical conditions and request to go home       Scheduled medications  amitriptyline, 10 mg, oral, Nightly  cyanocobalamin, 500 mcg, oral, Daily  heparin flush, 50 Units, intra-catheter, q12h  levothyroxine, 100 mcg, oral, Daily  metoprolol tartrate, 50 mg, oral, BID  mirtazapine, 15 mg, oral, Nightly  pantoprazole, 40 mg, oral, Daily  valsartan, 160 mg, oral, Daily      Continuous medications  oxygen, 2 L/min, Last Rate: Stopped (12/08/23 1821)  sodium chloride 0.9%, 50 mL/hr, Last Rate: 50 mL/hr (12/09/23 0000)      PRN medications  PRN medications: acetaminophen, alteplase, heparin flush, heparin lock flush (porcine), LORazepam, morphine, morphine, ondansetron   Results for orders placed or performed during the hospital encounter of 12/07/23 (from the past 96 hour(s))   CBC and Auto Differential   Result Value Ref Range    WBC 10.1 4.4 - 11.3 x10*3/uL    nRBC 0.0 0.0 - 0.0 /100 WBCs    RBC 3.76 (L) 4.00 - 5.20 x10*6/uL    Hemoglobin 11.1 (L) 12.0 - 16.0 g/dL    Hematocrit 34.1 (L) 36.0 - 46.0 %    MCV 91 80 - 100 fL    MCH 29.5 26.0 - 34.0 pg    MCHC 32.6 32.0 - 36.0 g/dL    RDW 16.0 (H) 11.5 - 14.5 %    Platelets 165 150 - 450 x10*3/uL    Neutrophils % 70.9 40.0 - 80.0 %    Immature Granulocytes %, Automated 1.4 (H) 0.0 - 0.9 %    Lymphocytes % 19.4 13.0 - 44.0 %    Monocytes % 4.6 2.0 - 10.0 %    Eosinophils % 3.5 0.0 - 6.0 %    Basophils % 0.2 0.0 - 2.0 %    Neutrophils Absolute 7.19 (H) 1.60 - 5.50 x10*3/uL    Immature Granulocytes Absolute, Automated 0.14 0.00 - 0.50 x10*3/uL    Lymphocytes Absolute 1.97 0.80 - 3.00 x10*3/uL    Monocytes Absolute 0.47 0.05 - 0.80 x10*3/uL    Eosinophils Absolute 0.35 0.00 - 0.40 x10*3/uL    Basophils Absolute 0.02 0.00 - 0.10 x10*3/uL   Protime-INR   Result Value Ref Range     Protime 25.5 (H) 9.8 - 12.8 seconds    INR 2.2 (H) 0.9 - 1.1   Comprehensive metabolic panel   Result Value Ref Range    Glucose 133 (H) 74 - 99 mg/dL    Sodium 138 136 - 145 mmol/L    Potassium 4.2 3.5 - 5.3 mmol/L    Chloride 104 98 - 107 mmol/L    Bicarbonate 27 21 - 32 mmol/L    Anion Gap 11 10 - 20 mmol/L    Urea Nitrogen 24 (H) 6 - 23 mg/dL    Creatinine 1.35 (H) 0.50 - 1.05 mg/dL    eGFR 37 (L) >60 mL/min/1.73m*2    Calcium 8.8 8.6 - 10.3 mg/dL    Albumin 4.0 3.4 - 5.0 g/dL    Alkaline Phosphatase 55 33 - 136 U/L    Total Protein 6.1 (L) 6.4 - 8.2 g/dL    AST 14 9 - 39 U/L    Bilirubin, Total 0.8 0.0 - 1.2 mg/dL    ALT 8 7 - 45 U/L   Type and Screen   Result Value Ref Range    ABO TYPE A     Rh TYPE POS     ANTIBODY SCREEN NEG    CBC   Result Value Ref Range    WBC 9.2 4.4 - 11.3 x10*3/uL    nRBC 0.0 0.0 - 0.0 /100 WBCs    RBC 3.44 (L) 4.00 - 5.20 x10*6/uL    Hemoglobin 10.1 (L) 12.0 - 16.0 g/dL    Hematocrit 31.7 (L) 36.0 - 46.0 %    MCV 92 80 - 100 fL    MCH 29.4 26.0 - 34.0 pg    MCHC 31.9 (L) 32.0 - 36.0 g/dL    RDW 16.2 (H) 11.5 - 14.5 %    Platelets 146 (L) 150 - 450 x10*3/uL   Renal function panel   Result Value Ref Range    Glucose 131 (H) 74 - 99 mg/dL    Sodium 141 136 - 145 mmol/L    Potassium 4.5 3.5 - 5.3 mmol/L    Chloride 105 98 - 107 mmol/L    Bicarbonate 27 21 - 32 mmol/L    Anion Gap 14 10 - 20 mmol/L    Urea Nitrogen 23 6 - 23 mg/dL    Creatinine 1.25 (H) 0.50 - 1.05 mg/dL    eGFR 41 (L) >60 mL/min/1.73m*2    Calcium 8.5 (L) 8.6 - 10.3 mg/dL    Phosphorus 4.1 2.5 - 4.9 mg/dL    Albumin 3.8 3.4 - 5.0 g/dL   Magnesium   Result Value Ref Range    Magnesium 1.70 1.60 - 2.40 mg/dL   Protime-INR   Result Value Ref Range    Protime 23.7 (H) 9.8 - 12.8 seconds    INR 2.1 (H) 0.9 - 1.1   Protime-INR   Result Value Ref Range    Protime 22.0 (H) 9.8 - 12.8 seconds    INR 1.9 (H) 0.9 - 1.1   CBC   Result Value Ref Range    WBC 10.3 4.4 - 11.3 x10*3/uL    nRBC 0.0 0.0 - 0.0 /100 WBCs    RBC 2.82 (L)  4.00 - 5.20 x10*6/uL    Hemoglobin 8.4 (L) 12.0 - 16.0 g/dL    Hematocrit 26.5 (L) 36.0 - 46.0 %    MCV 94 80 - 100 fL    MCH 29.8 26.0 - 34.0 pg    MCHC 31.7 (L) 32.0 - 36.0 g/dL    RDW 16.2 (H) 11.5 - 14.5 %    Platelets 130 (L) 150 - 450 x10*3/uL   Basic Metabolic Panel   Result Value Ref Range    Glucose 157 (H) 74 - 99 mg/dL    Sodium 139 136 - 145 mmol/L    Potassium 4.2 3.5 - 5.3 mmol/L    Chloride 105 98 - 107 mmol/L    Bicarbonate 26 21 - 32 mmol/L    Anion Gap 12 10 - 20 mmol/L    Urea Nitrogen 27 (H) 6 - 23 mg/dL    Creatinine 1.33 (H) 0.50 - 1.05 mg/dL    eGFR 38 (L) >60 mL/min/1.73m*2    Calcium 7.9 (L) 8.6 - 10.3 mg/dL   Magnesium   Result Value Ref Range    Magnesium 1.68 1.60 - 2.40 mg/dL   Protime-INR   Result Value Ref Range    Protime 24.6 (H) 9.8 - 12.8 seconds    INR 2.2 (H) 0.9 - 1.1      FL less than 1 hour    Result Date: 12/8/2023  These images are not reportable by radiology and will not be interpreted by  Radiologists.    XR hip left with pelvis when performed 2 or 3 views    Result Date: 12/7/2023  Interpreted By:  Charly Siddiqi, STUDY: XR HIP LEFT WITH PELVIS WHEN PERFORMED 2 OR 3 VIEWS; ;  12/7/2023 5:43 pm   INDICATION: Signs/Symptoms:fall, left forearm/wrist pain, lhip pain, lower back pain.   COMPARISON: December 2, 2022 CT scan abdomen and pelvis.   ACCESSION NUMBER(S): [id number]   ORDERING CLINICIAN: AVA COPELAND   FINDINGS: Acute displaced and angulated left intertrochanteric fractures including displacement of the trochanters. There are scattered degenerative changes and arterial vascular calcifications. No suspicious osseous lesion.       Acute left angulated and displaced intertrochanteric fractures with displacement of the trochanters. Depending on clinical concern CT correlation could be considered.     MACRO: None   Signed by: Charly Siddiqi 12/7/2023 6:32 PM Dictation workstation:   [id number]    CT lumbar spine wo IV contrast    Result Date:  12/7/2023  Interpreted By:  Charly Siddiqi, STUDY: CT LUMBAR SPINE WO IV CONTRAST  12/7/2023 6:01 pm   INDICATION: Signs/Symptoms:fall, left forearm/wrist pain, lhip pain, lower back pain   COMPARISON: None. December 2, 2022 CT abdomen and pelvis and same day left hip radiographs.   ACCESSION NUMBER(S): [id number]   ORDERING CLINICIAN: AVA COPELAND   TECHNIQUE: Axial CT images of the lumbar spine are obtained. Axial, coronal and sagittal reconstructions are provided for review.   FINDINGS: Alignment:  Stable mild rightward curvature lumbar spine. No evidence significant subluxation.   Vertebra and intervertebral disc spaces: There is underlying osseous demineralization limiting the accuracy of the assessment. Grossly stable moderate grade L2 compressive deformity. No evident acute lumbosacral spine fracture or suspicious osseous lesion. Left proximal femoral fracture noted on same day radiographs not well delineated.   Grossly stable osseous findings related to multilevel spondylosis and degenerative disc changes.   Prevertebral/Paraspinal Soft Tissues: No detected acute hematoma. Grossly stable pre-existing findings including extensive arterial vascular calcifications, probable scar or atelectasis at the lung bases, colonic diverticulosis and probably benign renal cysts.       Left proximal femoral fractures noted on same day radiographs not well delineated.   No acute detected abnormality involving the lumbar spine.   MACRO: None   Signed by: Charly Siddiqi 12/7/2023 6:26 PM Dictation workstation:   [id number]    XR forearm left 2 views    Result Date: 12/7/2023  Interpreted By:  Charly Siddiqi, STUDY: XR WRIST LEFT 3+ VIEWS; XR FOREARM LEFT 2 VIEWS; ;  12/7/2023 5:43 pm   INDICATION: Signs/Symptoms:fall, left forearm/wrist pain, lhip pain, lower back pain.   COMPARISON: March 25, 2014 wrist radiographs.   ACCESSION NUMBER(S): [id number]; [id number]   ORDERING CLINICIAN: AVA COPELAND   FINDINGS: 2  views of the left forearm and three views of the left wrist were obtained. There is osseous demineralization which limits the accuracy of the assessment.   No detected elbow region fracture or significant elbow effusion.   Grossly intact plate screw fixation hardware distal radius. There is newly seen subtle buckle deformity involving the extra-articular portion of the distal radius potentially indicating acute impaction fracture versus chronic changes including findings related to difference in patient position. Attention recommended on clinical assessment. Stable seen positive ulnar variance measuring 4 mm. No gross displaced acute fractures. Polyarticular arthrosis and chondrocalcinosis. There are scattered arterial vascular calcifications.       Newly seen subtle difference in the appearance of the distal radius potentially due to impaction fracture versus difference in patient position. Attention recommended on clinical assessment. Depending on concern CT correlation could be considered.   Additional findings as reported.       MACRO: None   Signed by: Charly Siddiqi 12/7/2023 6:18 PM Dictation workstation:   [id number]    XR wrist left 3+ views    Result Date: 12/7/2023  Interpreted By:  Charly Siddiqi, STUDY: XR WRIST LEFT 3+ VIEWS; XR FOREARM LEFT 2 VIEWS; ;  12/7/2023 5:43 pm   INDICATION: Signs/Symptoms:fall, left forearm/wrist pain, lhip pain, lower back pain.   COMPARISON: March 25, 2014 wrist radiographs.   ACCESSION NUMBER(S): [id number]; [id number]   ORDERING CLINICIAN: AVA COPELAND   FINDINGS: 2 views of the left forearm and three views of the left wrist were obtained. There is osseous demineralization which limits the accuracy of the assessment.   No detected elbow region fracture or significant elbow effusion.   Grossly intact plate screw fixation hardware distal radius. There is newly seen subtle buckle deformity involving the extra-articular portion of the distal radius potentially  indicating acute impaction fracture versus chronic changes including findings related to difference in patient position. Attention recommended on clinical assessment. Stable seen positive ulnar variance measuring 4 mm. No gross displaced acute fractures. Polyarticular arthrosis and chondrocalcinosis. There are scattered arterial vascular calcifications.       Newly seen subtle difference in the appearance of the distal radius potentially due to impaction fracture versus difference in patient position. Attention recommended on clinical assessment. Depending on concern CT correlation could be considered.   Additional findings as reported.       MACRO: None   Signed by: Charly Siddiqi 12/7/2023 6:18 PM Dictation workstation:   [id number]    Relevant Results               Assessment/Plan                  Principal Problem:    Fall, initial encounter  Active Problems:    Anxiety state    Atrial fibrillation (CMS/HCC)    Depression, major, single episode, moderate (CMS/HCC)    HTN (hypertension)    Hyperlipidemia    Hypothyroidism    Left wrist fracture, closed, initial encounter    GERD (gastroesophageal reflux disease)    CAD (coronary artery disease)    Closed fracture of left hip (CMS/HCC)    1. Closed fracture of left hip, initial encounter (CMS/[facility])  Case Request Operating Room: Hip Fracture open reduction internal fixation with nail    Case Request Operating Room: Hip Fracture open reduction internal fixation with nail    s/p ORIF, supportive care, may need SNF placement, need casemanagement      2. Fall, initial encounter        3. History of anticoagulant therapy      monitor INR/PTT      4. Primary hypertension      continue metoprolol      5. Atrial fibrillation, unspecified type (CMS/HCC)      monitor INR and adjust coumadin as indicated      6. Hypothyroidism, unspecified type      continue synthroid      7. Cognitive disorder      need family memhber for supportive care.                     Basim Renee,  MD

## 2023-12-10 ENCOUNTER — APPOINTMENT (OUTPATIENT)
Dept: RADIOLOGY | Facility: HOSPITAL | Age: 88
DRG: 480 | End: 2023-12-10
Payer: MEDICARE

## 2023-12-10 LAB
ANION GAP SERPL CALC-SCNC: 18 MMOL/L (ref 10–20)
BUN SERPL-MCNC: 43 MG/DL (ref 6–23)
CALCIUM SERPL-MCNC: 7.7 MG/DL (ref 8.6–10.3)
CHLORIDE SERPL-SCNC: 102 MMOL/L (ref 98–107)
CO2 SERPL-SCNC: 20 MMOL/L (ref 21–32)
CREAT SERPL-MCNC: 1.86 MG/DL (ref 0.5–1.05)
ERYTHROCYTE [DISTWIDTH] IN BLOOD BY AUTOMATED COUNT: 16.7 % (ref 11.5–14.5)
GFR SERPL CREATININE-BSD FRML MDRD: 25 ML/MIN/1.73M*2
GLUCOSE SERPL-MCNC: 165 MG/DL (ref 74–99)
HCT VFR BLD AUTO: 26.8 % (ref 36–46)
HGB BLD-MCNC: 8.2 G/DL (ref 12–16)
INR PPP: 3.2 (ref 0.9–1.1)
MCH RBC QN AUTO: 29 PG (ref 26–34)
MCHC RBC AUTO-ENTMCNC: 30.6 G/DL (ref 32–36)
MCV RBC AUTO: 95 FL (ref 80–100)
NRBC BLD-RTO: 0 /100 WBCS (ref 0–0)
PLATELET # BLD AUTO: 177 X10*3/UL (ref 150–450)
POTASSIUM SERPL-SCNC: 4.3 MMOL/L (ref 3.5–5.3)
PROTHROMBIN TIME: 36.8 SECONDS (ref 9.8–12.8)
RBC # BLD AUTO: 2.83 X10*6/UL (ref 4–5.2)
SODIUM SERPL-SCNC: 136 MMOL/L (ref 136–145)
WBC # BLD AUTO: 15.1 X10*3/UL (ref 4.4–11.3)

## 2023-12-10 PROCEDURE — 2500000005 HC RX 250 GENERAL PHARMACY W/O HCPCS: Performed by: ORTHOPAEDIC SURGERY

## 2023-12-10 PROCEDURE — 99233 SBSQ HOSP IP/OBS HIGH 50: CPT | Performed by: INTERNAL MEDICINE

## 2023-12-10 PROCEDURE — 1200000002 HC GENERAL ROOM WITH TELEMETRY DAILY

## 2023-12-10 PROCEDURE — 2500000001 HC RX 250 WO HCPCS SELF ADMINISTERED DRUGS (ALT 637 FOR MEDICARE OP): Performed by: ORTHOPAEDIC SURGERY

## 2023-12-10 PROCEDURE — 36415 COLL VENOUS BLD VENIPUNCTURE: CPT | Performed by: INTERNAL MEDICINE

## 2023-12-10 PROCEDURE — 71045 X-RAY EXAM CHEST 1 VIEW: CPT

## 2023-12-10 PROCEDURE — 80048 BASIC METABOLIC PNL TOTAL CA: CPT | Performed by: INTERNAL MEDICINE

## 2023-12-10 PROCEDURE — 2500000004 HC RX 250 GENERAL PHARMACY W/ HCPCS (ALT 636 FOR OP/ED): Performed by: ORTHOPAEDIC SURGERY

## 2023-12-10 PROCEDURE — 85610 PROTHROMBIN TIME: CPT | Performed by: ORTHOPAEDIC SURGERY

## 2023-12-10 PROCEDURE — 2500000004 HC RX 250 GENERAL PHARMACY W/ HCPCS (ALT 636 FOR OP/ED): Performed by: INTERNAL MEDICINE

## 2023-12-10 PROCEDURE — 71045 X-RAY EXAM CHEST 1 VIEW: CPT | Performed by: RADIOLOGY

## 2023-12-10 PROCEDURE — 85027 COMPLETE CBC AUTOMATED: CPT | Performed by: INTERNAL MEDICINE

## 2023-12-10 PROCEDURE — 2500000001 HC RX 250 WO HCPCS SELF ADMINISTERED DRUGS (ALT 637 FOR MEDICARE OP): Performed by: PHYSICIAN ASSISTANT

## 2023-12-10 RX ADMIN — LEVOTHYROXINE SODIUM 100 MCG: 0.1 TABLET ORAL at 06:00

## 2023-12-10 RX ADMIN — CYANOCOBALAMIN TAB 500 MCG 500 MCG: 500 TAB at 08:27

## 2023-12-10 RX ADMIN — ACETAMINOPHEN 650 MG: 325 TABLET ORAL at 00:17

## 2023-12-10 RX ADMIN — AMITRIPTYLINE HYDROCHLORIDE 10 MG: 10 TABLET, FILM COATED ORAL at 20:16

## 2023-12-10 RX ADMIN — SODIUM CHLORIDE 250 ML: 9 INJECTION, SOLUTION INTRAVENOUS at 10:21

## 2023-12-10 RX ADMIN — PANTOPRAZOLE SODIUM 40 MG: 40 TABLET, DELAYED RELEASE ORAL at 08:27

## 2023-12-10 RX ADMIN — SODIUM CHLORIDE, POTASSIUM CHLORIDE, SODIUM LACTATE AND CALCIUM CHLORIDE 250 ML: 600; 310; 30; 20 INJECTION, SOLUTION INTRAVENOUS at 10:06

## 2023-12-10 RX ADMIN — MIRTAZAPINE 15 MG: 15 TABLET, FILM COATED ORAL at 20:16

## 2023-12-10 RX ADMIN — Medication 2 L/MIN: at 08:00

## 2023-12-10 RX ADMIN — SODIUM CHLORIDE 75 ML/HR: 9 INJECTION, SOLUTION INTRAVENOUS at 23:09

## 2023-12-10 ASSESSMENT — COGNITIVE AND FUNCTIONAL STATUS - GENERAL
STANDING UP FROM CHAIR USING ARMS: A LOT
WALKING IN HOSPITAL ROOM: A LOT
DRESSING REGULAR LOWER BODY CLOTHING: TOTAL
DRESSING REGULAR UPPER BODY CLOTHING: A LOT
TOILETING: TOTAL
MOVING FROM LYING ON BACK TO SITTING ON SIDE OF FLAT BED WITH BEDRAILS: A LOT
CLIMB 3 TO 5 STEPS WITH RAILING: TOTAL
MOBILITY SCORE: 11
TOILETING: A LOT
HELP NEEDED FOR BATHING: A LOT
MOVING FROM LYING ON BACK TO SITTING ON SIDE OF FLAT BED WITH BEDRAILS: A LOT
TURNING FROM BACK TO SIDE WHILE IN FLAT BAD: A LOT
TURNING FROM BACK TO SIDE WHILE IN FLAT BAD: A LOT
DAILY ACTIVITIY SCORE: 13
MOVING TO AND FROM BED TO CHAIR: A LOT
DAILY ACTIVITIY SCORE: 11
STANDING UP FROM CHAIR USING ARMS: A LOT
PERSONAL GROOMING: A LITTLE
DRESSING REGULAR LOWER BODY CLOTHING: TOTAL
HELP NEEDED FOR BATHING: A LOT
DRESSING REGULAR UPPER BODY CLOTHING: A LOT
MOVING TO AND FROM BED TO CHAIR: A LOT
PERSONAL GROOMING: A LOT
CLIMB 3 TO 5 STEPS WITH RAILING: TOTAL
EATING MEALS: A LITTLE
EATING MEALS: A LITTLE
WALKING IN HOSPITAL ROOM: A LOT
MOBILITY SCORE: 11

## 2023-12-10 ASSESSMENT — PAIN DESCRIPTION - ORIENTATION: ORIENTATION: LEFT

## 2023-12-10 ASSESSMENT — PAIN SCALES - GENERAL
PAINLEVEL_OUTOF10: 5 - MODERATE PAIN
PAINLEVEL_OUTOF10: 0 - NO PAIN
PAINLEVEL_OUTOF10: 0 - NO PAIN

## 2023-12-10 ASSESSMENT — PAIN DESCRIPTION - LOCATION: LOCATION: HIP

## 2023-12-10 ASSESSMENT — PAIN - FUNCTIONAL ASSESSMENT
PAIN_FUNCTIONAL_ASSESSMENT: 0-10

## 2023-12-10 NOTE — PROGRESS NOTES
"Lyn Turner is a 89 y.o. female on day 2 of admission presenting with Fall, initial encounter.    Subjective   Patient resting in bed comfortably.  Patient states hip feels better.  Eating breakfast.  No CP/SOB.  No N/V/D/C.      Objective   RLE  Warm foot, BCR  SILT  5/5 PF, DF, EHL  Dressing C/D/I    Last Recorded Vitals  Blood pressure 96/61, pulse 101, temperature 36.9 °C (98.5 °F), temperature source Temporal, resp. rate 24, height 1.6 m (5' 3\"), weight 60.9 kg (134 lb 4.2 oz), SpO2 96 %.  Intake/Output last 3 Shifts:  I/O last 3 completed shifts:  In: 3233.7 (53.1 mL/kg) [P.O.:400; I.V.:2833.7 (46.5 mL/kg)]  Out: 150 (2.5 mL/kg) [Urine:150 (0.1 mL/kg/hr)]  Weight: 60.9 kg     Relevant Results      Scheduled medications  amitriptyline, 10 mg, oral, Nightly  cyanocobalamin, 500 mcg, oral, Daily  heparin flush, 50 Units, intra-catheter, q12h  levothyroxine, 100 mcg, oral, Daily  metoprolol tartrate, 50 mg, oral, BID  mirtazapine, 15 mg, oral, Nightly  pantoprazole, 40 mg, oral, Daily  sodium chloride, 250 mL, intravenous, Once  valsartan, 160 mg, oral, Daily  warfarin, 2 mg, oral, Once per day on Mon Wed Fri Sat  warfarin, 4 mg, oral, Once per day on Sun Tue Thu      Continuous medications  oxygen, 2 L/min, Last Rate: 0 L/min (12/08/23 1821)  sodium chloride 0.9%, 75 mL/hr, Last Rate: 75 mL/hr (12/10/23 0949)      PRN medications  PRN medications: acetaminophen, alteplase, heparin flush, heparin lock flush (porcine), LORazepam, morphine, morphine, ondansetron  Results for orders placed or performed during the hospital encounter of 12/07/23 (from the past 24 hour(s))   Protime-INR   Result Value Ref Range    Protime 36.8 (H) 9.8 - 12.8 seconds    INR 3.2 (H) 0.9 - 1.1   Basic Metabolic Panel   Result Value Ref Range    Glucose 165 (H) 74 - 99 mg/dL    Sodium 136 136 - 145 mmol/L    Potassium 4.3 3.5 - 5.3 mmol/L    Chloride 102 98 - 107 mmol/L    Bicarbonate 20 (L) 21 - 32 mmol/L    Anion Gap 18 10 - 20 mmol/L "    Urea Nitrogen 43 (H) 6 - 23 mg/dL    Creatinine 1.86 (H) 0.50 - 1.05 mg/dL    eGFR 25 (L) >60 mL/min/1.73m*2    Calcium 7.7 (L) 8.6 - 10.3 mg/dL   CBC   Result Value Ref Range    WBC 15.1 (H) 4.4 - 11.3 x10*3/uL    nRBC 0.0 0.0 - 0.0 /100 WBCs    RBC 2.83 (L) 4.00 - 5.20 x10*6/uL    Hemoglobin 8.2 (L) 12.0 - 16.0 g/dL    Hematocrit 26.8 (L) 36.0 - 46.0 %    MCV 95 80 - 100 fL    MCH 29.0 26.0 - 34.0 pg    MCHC 30.6 (L) 32.0 - 36.0 g/dL    RDW 16.7 (H) 11.5 - 14.5 %    Platelets 177 150 - 450 x10*3/uL                            Assessment/Plan   Assessment/Plan   POD#2 s/p left hip IMN for IT fracture  I discussed the diagnosis and treatment options with the patient today along with their associated risks and benefits. After thorough discussion, the patient has elected to proceed with conservative management. All questions were answered to the patients satisfaction who seems satisfied with the plan.      Removed dressing POD#7  Remove staples POD#14  DVT prophylaxis - resume coumadin   PT - WBAT, E/T  FU 2 weeks for wound check - No XR  Leon Berry DO

## 2023-12-10 NOTE — CARE PLAN
The patient's goals for the shift include    Problem: Skin  Goal: Decreased wound size/increased tissue granulation at next dressing change  Outcome: Progressing  Flowsheets (Taken 12/10/2023 0734)  Decreased wound size/increased tissue granulation at next dressing change: Protective dressings over bony prominences  Goal: Participates in plan/prevention/treatment measures  Outcome: Progressing  Flowsheets (Taken 12/10/2023 0734)  Participates in plan/prevention/treatment measures:   Elevate heels   Discuss with provider PT/OT consult  Goal: Prevent/manage excess moisture  Outcome: Progressing  Flowsheets (Taken 12/10/2023 0734)  Prevent/manage excess moisture: Monitor for/manage infection if present  Goal: Prevent/minimize sheer/friction injuries  Outcome: Progressing  Flowsheets (Taken 12/10/2023 0734)  Prevent/minimize sheer/friction injuries:   Complete micro-shifts as needed if patient unable. Adjust patient position to relieve pressure points, not a full turn   Turn/reposition every 2 hours/use positioning/transfer devices  Goal: Promote/optimize nutrition  Outcome: Progressing  Flowsheets (Taken 12/10/2023 0734)  Promote/optimize nutrition: Monitor/record intake including meals  Goal: Promote skin healing  Outcome: Progressing  Flowsheets (Taken 12/10/2023 0734)  Promote skin healing:   Turn/reposition every 2 hours/use positioning/transfer devices   Assess skin/pad under line(s)/device(s)     Problem: Pain  Goal: Takes deep breaths with improved pain control throughout the shift  Outcome: Progressing  Goal: Turns in bed with improved pain control throughout the shift  Outcome: Progressing  Goal: Walks with improved pain control throughout the shift  Outcome: Progressing  Goal: Performs ADL's with improved pain control throughout shift  Outcome: Progressing  Goal: Participates in PT with improved pain control throughout the shift  Outcome: Progressing  Goal: Free from opioid side effects throughout the  shift  Outcome: Progressing  Goal: Free from acute confusion related to pain meds throughout the shift  Outcome: Progressing     Problem: Safety  Goal: I will remain free of falls  Outcome: Progressing     Problem: Daily Care  Goal: Daily care needs are met  Outcome: Progressing     Problem: Psychosocial Needs  Goal: Demonstrates ability to cope with hospitalization/illness  Outcome: Progressing  Goal: Collaborate with me, my family, and caregiver to identify my specific goals  Outcome: Progressing     Problem: Discharge Barriers  Goal: My discharge needs are met  Outcome: Progressing     Problem: Pain - Adult  Goal: Verbalizes/displays adequate comfort level or baseline comfort level  Outcome: Progressing     Problem: Safety - Adult  Goal: Free from fall injury  Outcome: Progressing     Problem: Discharge Planning  Goal: Discharge to home or other facility with appropriate resources  Outcome: Progressing     Problem: Chronic Conditions and Co-morbidities  Goal: Patient's chronic conditions and co-morbidity symptoms are monitored and maintained or improved  Outcome: Progressing

## 2023-12-10 NOTE — CARE PLAN
Problem: Pain  Goal: Takes deep breaths with improved pain control throughout the shift  Outcome: Progressing  Goal: Turns in bed with improved pain control throughout the shift  Outcome: Progressing  Goal: Walks with improved pain control throughout the shift  Outcome: Progressing  Goal: Performs ADL's with improved pain control throughout shift  Outcome: Progressing  Goal: Participates in PT with improved pain control throughout the shift  Outcome: Progressing  Goal: Free from opioid side effects throughout the shift  Outcome: Progressing  Goal: Free from acute confusion related to pain meds throughout the shift  Outcome: Progressing   The patient's goals for the shift include      The clinical goals for the shift include Pain management and patient safety      Problem: Safety  Goal: I will remain free of falls  Outcome: Progressing     Problem: Daily Care  Goal: Daily care needs are met  Outcome: Progressing     Problem: Psychosocial Needs  Goal: Demonstrates ability to cope with hospitalization/illness  Outcome: Progressing  Goal: Collaborate with me, my family, and caregiver to identify my specific goals  Outcome: Progressing     No falls or injury this shift. Patient remained safe and comfortable.

## 2023-12-10 NOTE — PROGRESS NOTES
Lyn Turner is a 89 y.o. female on day 2 of admission presenting with Fall, initial encounter.      Subjective   Lyn Turner is a 89 y.o.  female with a past medical history significant for HTN, HLD, CAD, atrial fibrillation on Coumadin, HFpEF, s/p PPM, breast cancer s/p chemo/radiation/left mastectomy, thyroid cancer s/p thyroidectomy, hypothyroidism, anxiety, depression and GERD who presented 12/7/23 after a mechanical fall.  She states that she was walking from her kitchen into her living room where there is a small step and after she stepped down she was trying to talk to her family member who was behind her and when she turned she fell back landing on her left forearm as well as left hip.  She did not hit her head or lose consciousness.  She states that the step is maybe a 4 inch or less step between the 2 areas. Patient states that she has had prior wrist surgeries and about 2 to 3 years ago had actually broken her wrist on the left side.  Patient states that she has not taken any Coumadin today. Urinalysis was grossly unremarkable for any infectious etiologies. CT lumbar noted a left proximal femoral fracture, no acute abnormalities, degenerative changes.  XR left hip: Acute left angulated/displaced intertrochanteric fracture with displacement of the trochanters.  XR left wrist/forearm: Noted nearly seen subtle difference in appearance of the distal radius potentially due to impaction fracture versus difference in positioning. The ED physician did discuss case with the on-call orthopedic surgeon who had stated the forearm had a likely buckle fracture with intact hardware from prior surgical intervention and that they planned on taking the patient to the OR for surgical repair of the hip.     12/08/23: No acute events overnight. Vitals stable. Labs largely unchanged. INR 2.1. Hgb 10.1 (11.1). Going to OR for L IMN today     12/9/2023: No acute events overnight. S/p left hip ORIF on 12/8/2023  pod#1. Patient tolerated procedure well and no acute complains of chest pain/shortness of breath/nausea/vomiting. Patient had cognitive abnormalities that she was not aware of the surgery and demanded to go home right away. I ordered ativan for possible agitation. Patient need close family member to comfort.      12/10/2023: Nurse staff reported patient try to climb out of bed. S/p left hip ORIF on 12/8/2023 pod#2. Patient had low BP and increased BUN/creatinine. I give one bolus of 250 ml NS and increase NS 75 ml/hr. Patient had pain of left hip when exertion. Pain improved when rest at bed. Pain meds on board. She tolerated diet well.       Objective     Last Recorded Vitals  BP 96/61   Pulse 101   Temp 36.9 °C (98.5 °F) (Temporal)   Resp 24   Wt 60.9 kg (134 lb 4.2 oz)   SpO2 96%   Intake/Output last 3 Shifts:    Intake/Output Summary (Last 24 hours) at 12/10/2023 1046  Last data filed at 12/10/2023 1031  Gross per 24 hour   Intake 2843.71 ml   Output --   Net 2843.71 ml       Admission Weight  Weight: 60.9 kg (134 lb 4.2 oz) (12/07/23 1629)    Daily Weight  12/07/23 : 60.9 kg (134 lb 4.2 oz)    Image Results  XR chest 1 view  Narrative: Interpreted By:  Finkelstein, Evan,   STUDY:  XR CHEST 1 VIEW;  12/10/2023 3:32 am      INDICATION:  Signs/Symptoms:check foriegn object from pulling out medport.      COMPARISON:  Chest radiograph 06/14/2019      ACCESSION NUMBER(S):  [id number]      ORDERING CLINICIAN:  MIKAELA WEBB      FINDINGS:  Redemonstrated right chest wall port, tip overlying the right atrium.  Redemonstrated left chest wall pacemaker.      CARDIOMEDIASTINAL SILHOUETTE:  Enlarged cardiac silhouette, similar compared to prior imaging.      LUNGS:  No pulmonary consolidation, pleural effusion or pneumothorax.      ABDOMEN:  No remarkable upper abdominal findings.      BONES:  No acute osseous abnormality. Surgical clips overlie the left chest  wall.      Impression: No radiographic evidence of acute  cardiopulmonary pathology.      MACRO:  None.      Signed by: Juan F Finkelstein 12/10/2023 3:37 AM  Dictation workstation:   [id number]      Physical Exam  Constitutional:       General: She is not in acute distress.     Appearance: Normal appearance.   HENT:      Head: Normocephalic.      Mouth/Throat:      Mouth: Mucous membranes are moist.      Pharynx: Oropharynx is clear.   Eyes:      Pupils: Pupils are equal, round, and reactive to light.   Cardiovascular:      Rate and Rhythm: Normal rate and regular rhythm.      Heart sounds: Normal heart sounds. No murmur heard.     No gallop.   Pulmonary:      Effort: Pulmonary effort is normal.      Breath sounds: Normal breath sounds.   Abdominal:      General: Bowel sounds are normal. There is no distension.      Palpations: Abdomen is soft.      Tenderness: There is no abdominal tenderness.   Musculoskeletal:         General: No swelling.      Cervical back: Neck supple. No rigidity.      Comments: Left hip surgery wound dressed well, no drain.   Skin:     General: Skin is warm and dry.   Neurological:      General: No focal deficit present.      Mental Status: She is alert.      Cranial Nerves: No cranial nerve deficit.      Sensory: No sensory deficit.   Psychiatric:      Comments: Some agitation /confusion. She is not aware of the surgery and medical conditions and request to go home       Scheduled medications  amitriptyline, 10 mg, oral, Nightly  cyanocobalamin, 500 mcg, oral, Daily  heparin flush, 50 Units, intra-catheter, q12h  levothyroxine, 100 mcg, oral, Daily  metoprolol tartrate, 50 mg, oral, BID  mirtazapine, 15 mg, oral, Nightly  pantoprazole, 40 mg, oral, Daily  sodium chloride, 250 mL, intravenous, Once  valsartan, 160 mg, oral, Daily  warfarin, 2 mg, oral, Once per day on Mon Wed Fri Sat  warfarin, 4 mg, oral, Once per day on Sun Tue Thu      Continuous medications  oxygen, 2 L/min, Last Rate: 0 L/min (12/08/23 1821)  sodium chloride 0.9%, 75 mL/hr,  Last Rate: 75 mL/hr (12/10/23 0949)      PRN medications  PRN medications: acetaminophen, alteplase, heparin flush, heparin lock flush (porcine), LORazepam, morphine, morphine, ondansetron   Results for orders placed or performed during the hospital encounter of 12/07/23 (from the past 96 hour(s))   CBC and Auto Differential   Result Value Ref Range    WBC 10.1 4.4 - 11.3 x10*3/uL    nRBC 0.0 0.0 - 0.0 /100 WBCs    RBC 3.76 (L) 4.00 - 5.20 x10*6/uL    Hemoglobin 11.1 (L) 12.0 - 16.0 g/dL    Hematocrit 34.1 (L) 36.0 - 46.0 %    MCV 91 80 - 100 fL    MCH 29.5 26.0 - 34.0 pg    MCHC 32.6 32.0 - 36.0 g/dL    RDW 16.0 (H) 11.5 - 14.5 %    Platelets 165 150 - 450 x10*3/uL    Neutrophils % 70.9 40.0 - 80.0 %    Immature Granulocytes %, Automated 1.4 (H) 0.0 - 0.9 %    Lymphocytes % 19.4 13.0 - 44.0 %    Monocytes % 4.6 2.0 - 10.0 %    Eosinophils % 3.5 0.0 - 6.0 %    Basophils % 0.2 0.0 - 2.0 %    Neutrophils Absolute 7.19 (H) 1.60 - 5.50 x10*3/uL    Immature Granulocytes Absolute, Automated 0.14 0.00 - 0.50 x10*3/uL    Lymphocytes Absolute 1.97 0.80 - 3.00 x10*3/uL    Monocytes Absolute 0.47 0.05 - 0.80 x10*3/uL    Eosinophils Absolute 0.35 0.00 - 0.40 x10*3/uL    Basophils Absolute 0.02 0.00 - 0.10 x10*3/uL   Protime-INR   Result Value Ref Range    Protime 25.5 (H) 9.8 - 12.8 seconds    INR 2.2 (H) 0.9 - 1.1   Comprehensive metabolic panel   Result Value Ref Range    Glucose 133 (H) 74 - 99 mg/dL    Sodium 138 136 - 145 mmol/L    Potassium 4.2 3.5 - 5.3 mmol/L    Chloride 104 98 - 107 mmol/L    Bicarbonate 27 21 - 32 mmol/L    Anion Gap 11 10 - 20 mmol/L    Urea Nitrogen 24 (H) 6 - 23 mg/dL    Creatinine 1.35 (H) 0.50 - 1.05 mg/dL    eGFR 37 (L) >60 mL/min/1.73m*2    Calcium 8.8 8.6 - 10.3 mg/dL    Albumin 4.0 3.4 - 5.0 g/dL    Alkaline Phosphatase 55 33 - 136 U/L    Total Protein 6.1 (L) 6.4 - 8.2 g/dL    AST 14 9 - 39 U/L    Bilirubin, Total 0.8 0.0 - 1.2 mg/dL    ALT 8 7 - 45 U/L   Type and Screen   Result Value Ref  Range    ABO TYPE A     Rh TYPE POS     ANTIBODY SCREEN NEG    CBC   Result Value Ref Range    WBC 9.2 4.4 - 11.3 x10*3/uL    nRBC 0.0 0.0 - 0.0 /100 WBCs    RBC 3.44 (L) 4.00 - 5.20 x10*6/uL    Hemoglobin 10.1 (L) 12.0 - 16.0 g/dL    Hematocrit 31.7 (L) 36.0 - 46.0 %    MCV 92 80 - 100 fL    MCH 29.4 26.0 - 34.0 pg    MCHC 31.9 (L) 32.0 - 36.0 g/dL    RDW 16.2 (H) 11.5 - 14.5 %    Platelets 146 (L) 150 - 450 x10*3/uL   Renal function panel   Result Value Ref Range    Glucose 131 (H) 74 - 99 mg/dL    Sodium 141 136 - 145 mmol/L    Potassium 4.5 3.5 - 5.3 mmol/L    Chloride 105 98 - 107 mmol/L    Bicarbonate 27 21 - 32 mmol/L    Anion Gap 14 10 - 20 mmol/L    Urea Nitrogen 23 6 - 23 mg/dL    Creatinine 1.25 (H) 0.50 - 1.05 mg/dL    eGFR 41 (L) >60 mL/min/1.73m*2    Calcium 8.5 (L) 8.6 - 10.3 mg/dL    Phosphorus 4.1 2.5 - 4.9 mg/dL    Albumin 3.8 3.4 - 5.0 g/dL   Magnesium   Result Value Ref Range    Magnesium 1.70 1.60 - 2.40 mg/dL   Protime-INR   Result Value Ref Range    Protime 23.7 (H) 9.8 - 12.8 seconds    INR 2.1 (H) 0.9 - 1.1   Protime-INR   Result Value Ref Range    Protime 22.0 (H) 9.8 - 12.8 seconds    INR 1.9 (H) 0.9 - 1.1   CBC   Result Value Ref Range    WBC 10.3 4.4 - 11.3 x10*3/uL    nRBC 0.0 0.0 - 0.0 /100 WBCs    RBC 2.82 (L) 4.00 - 5.20 x10*6/uL    Hemoglobin 8.4 (L) 12.0 - 16.0 g/dL    Hematocrit 26.5 (L) 36.0 - 46.0 %    MCV 94 80 - 100 fL    MCH 29.8 26.0 - 34.0 pg    MCHC 31.7 (L) 32.0 - 36.0 g/dL    RDW 16.2 (H) 11.5 - 14.5 %    Platelets 130 (L) 150 - 450 x10*3/uL   Basic Metabolic Panel   Result Value Ref Range    Glucose 157 (H) 74 - 99 mg/dL    Sodium 139 136 - 145 mmol/L    Potassium 4.2 3.5 - 5.3 mmol/L    Chloride 105 98 - 107 mmol/L    Bicarbonate 26 21 - 32 mmol/L    Anion Gap 12 10 - 20 mmol/L    Urea Nitrogen 27 (H) 6 - 23 mg/dL    Creatinine 1.33 (H) 0.50 - 1.05 mg/dL    eGFR 38 (L) >60 mL/min/1.73m*2    Calcium 7.9 (L) 8.6 - 10.3 mg/dL   Magnesium   Result Value Ref Range     Magnesium 1.68 1.60 - 2.40 mg/dL   Protime-INR   Result Value Ref Range    Protime 24.6 (H) 9.8 - 12.8 seconds    INR 2.2 (H) 0.9 - 1.1   Protime-INR   Result Value Ref Range    Protime 36.8 (H) 9.8 - 12.8 seconds    INR 3.2 (H) 0.9 - 1.1   Basic Metabolic Panel   Result Value Ref Range    Glucose 165 (H) 74 - 99 mg/dL    Sodium 136 136 - 145 mmol/L    Potassium 4.3 3.5 - 5.3 mmol/L    Chloride 102 98 - 107 mmol/L    Bicarbonate 20 (L) 21 - 32 mmol/L    Anion Gap 18 10 - 20 mmol/L    Urea Nitrogen 43 (H) 6 - 23 mg/dL    Creatinine 1.86 (H) 0.50 - 1.05 mg/dL    eGFR 25 (L) >60 mL/min/1.73m*2    Calcium 7.7 (L) 8.6 - 10.3 mg/dL   CBC   Result Value Ref Range    WBC 15.1 (H) 4.4 - 11.3 x10*3/uL    nRBC 0.0 0.0 - 0.0 /100 WBCs    RBC 2.83 (L) 4.00 - 5.20 x10*6/uL    Hemoglobin 8.2 (L) 12.0 - 16.0 g/dL    Hematocrit 26.8 (L) 36.0 - 46.0 %    MCV 95 80 - 100 fL    MCH 29.0 26.0 - 34.0 pg    MCHC 30.6 (L) 32.0 - 36.0 g/dL    RDW 16.7 (H) 11.5 - 14.5 %    Platelets 177 150 - 450 x10*3/uL      Relevant Results               Assessment/Plan                  Principal Problem:    Fall, initial encounter  Active Problems:    Anxiety state    Atrial fibrillation (CMS/[facility])    Depression, major, single episode, moderate (CMS/[facility])    HTN (hypertension)    Hyperlipidemia    Hypothyroidism    Left wrist fracture, closed, initial encounter    GERD (gastroesophageal reflux disease)    CAD (coronary artery disease)    Closed fracture of left hip (CMS/[facility])    1. Closed fracture of left hip, initial encounter (CMS/[facility])  Case Request Operating Room: Hip Fracture open reduction internal fixation with nail    Case Request Operating Room: Hip Fracture open reduction internal fixation with nail    s/p ORIF, supportive care, may need SNF placement, need casemanagement      2. Fall, initial encounter        3. History of anticoagulant therapy      monitor INR/PTT      4. Primary hypertension      LOw BP due to dehydration, monmitor and adjust  meds as needed.      5. Atrial fibrillation, unspecified type (CMS/[facility])      monitor INR and adjust coumadin as indicated      6. Hypothyroidism, unspecified type      continue synthroid      7. Cognitive disorder      need family memhber for supportive care.      8. ISAAC (acute kidney injury) (CMS/[facility])                       Basim Renee MD

## 2023-12-10 NOTE — PROGRESS NOTES
Patient pulled out her implantable Chest port. [facility] notified         Shoshana Vasquez RN

## 2023-12-10 NOTE — PROGRESS NOTES
Pharmacy Consult for Warfarin (Coumadin) Management - Daily Progress Note     Lyn Turner is a 89 y.o. female admitted for Fall, initial encounter. Pharmacy was consulted for warfarin dosing and monitoring.     Labs  INR   Date Value Ref Range Status   12/10/2023 3.2 (H) 0.9 - 1.1 Final   12/09/2023 2.2 (H) 0.9 - 1.1 Final   12/08/2023 1.9 (H) 0.9 - 1.1 Final     Review  Home regimen of warfarin 4 mg Sun/Tue/Thur and 2 mg Mon/Wed/Fri/Sat.  Will hold today and monitor tomorrow due supratherapeutic INR  Orders placed per pharmacy consult. Pharmacy will continue to monitor and adjust therapy as needed.   Herminio Lew [facility]

## 2023-12-11 ENCOUNTER — APPOINTMENT (OUTPATIENT)
Dept: RADIOLOGY | Facility: HOSPITAL | Age: 88
DRG: 480 | End: 2023-12-11
Payer: MEDICARE

## 2023-12-11 LAB
ANION GAP SERPL CALC-SCNC: 19 MMOL/L (ref 10–20)
APPEARANCE UR: CLEAR
BILIRUB UR STRIP.AUTO-MCNC: NEGATIVE MG/DL
BUN SERPL-MCNC: 74 MG/DL (ref 6–23)
CALCIUM SERPL-MCNC: 7.1 MG/DL (ref 8.6–10.3)
CHLORIDE SERPL-SCNC: 106 MMOL/L (ref 98–107)
CK SERPL-CCNC: 584 U/L (ref 0–215)
CO2 SERPL-SCNC: 19 MMOL/L (ref 21–32)
COLOR UR: YELLOW
CREAT SERPL-MCNC: 2.38 MG/DL (ref 0.5–1.05)
CREAT UR-MCNC: 114 MG/DL (ref 20–320)
ERYTHROCYTE [DISTWIDTH] IN BLOOD BY AUTOMATED COUNT: 16.5 % (ref 11.5–14.5)
GFR SERPL CREATININE-BSD FRML MDRD: 19 ML/MIN/1.73M*2
GLUCOSE BLD MANUAL STRIP-MCNC: 152 MG/DL (ref 74–99)
GLUCOSE BLD MANUAL STRIP-MCNC: 174 MG/DL (ref 74–99)
GLUCOSE SERPL-MCNC: 138 MG/DL (ref 74–99)
GLUCOSE UR STRIP.AUTO-MCNC: NEGATIVE MG/DL
HCT VFR BLD AUTO: 23 % (ref 36–46)
HCT VFR BLD AUTO: 24.2 % (ref 36–46)
HGB BLD-MCNC: 7.4 G/DL (ref 12–16)
HGB BLD-MCNC: 7.8 G/DL (ref 12–16)
HYALINE CASTS #/AREA URNS AUTO: ABNORMAL /LPF
INR PPP: 4 (ref 0.9–1.1)
INR PPP: 4.2 (ref 0.9–1.1)
KETONES UR STRIP.AUTO-MCNC: ABNORMAL MG/DL
LEUKOCYTE ESTERASE UR QL STRIP.AUTO: NEGATIVE
MAGNESIUM SERPL-MCNC: 2.01 MG/DL (ref 1.6–2.4)
MCH RBC QN AUTO: 29.8 PG (ref 26–34)
MCHC RBC AUTO-ENTMCNC: 32.2 G/DL (ref 32–36)
MCV RBC AUTO: 92 FL (ref 80–100)
NITRITE UR QL STRIP.AUTO: NEGATIVE
NRBC BLD-RTO: 0 /100 WBCS (ref 0–0)
PH UR STRIP.AUTO: 5 [PH]
PLATELET # BLD AUTO: 171 X10*3/UL (ref 150–450)
POTASSIUM SERPL-SCNC: 4.8 MMOL/L (ref 3.5–5.3)
PROT UR STRIP.AUTO-MCNC: ABNORMAL MG/DL
PROTHROMBIN TIME: 46.2 SECONDS (ref 9.8–12.8)
PROTHROMBIN TIME: 48.3 SECONDS (ref 9.8–12.8)
RBC # BLD AUTO: 2.62 X10*6/UL (ref 4–5.2)
RBC # UR STRIP.AUTO: NEGATIVE /UL
RBC #/AREA URNS AUTO: ABNORMAL /HPF
SODIUM SERPL-SCNC: 139 MMOL/L (ref 136–145)
SODIUM UR-SCNC: 34 MMOL/L
SODIUM/CREAT UR-RTO: 30 MMOL/G CREAT
SP GR UR STRIP.AUTO: 1.02
SQUAMOUS #/AREA URNS AUTO: ABNORMAL /HPF
UROBILINOGEN UR STRIP.AUTO-MCNC: <2 MG/DL
WBC # BLD AUTO: 14.1 X10*3/UL (ref 4.4–11.3)
WBC #/AREA URNS AUTO: ABNORMAL /HPF

## 2023-12-11 PROCEDURE — 99233 SBSQ HOSP IP/OBS HIGH 50: CPT | Performed by: HOSPITALIST

## 2023-12-11 PROCEDURE — 85027 COMPLETE CBC AUTOMATED: CPT | Performed by: HOSPITALIST

## 2023-12-11 PROCEDURE — 2500000004 HC RX 250 GENERAL PHARMACY W/ HCPCS (ALT 636 FOR OP/ED): Performed by: HOSPITALIST

## 2023-12-11 PROCEDURE — 83735 ASSAY OF MAGNESIUM: CPT | Performed by: HOSPITALIST

## 2023-12-11 PROCEDURE — 2500000005 HC RX 250 GENERAL PHARMACY W/O HCPCS: Performed by: HOSPITALIST

## 2023-12-11 PROCEDURE — 2500000004 HC RX 250 GENERAL PHARMACY W/ HCPCS (ALT 636 FOR OP/ED): Performed by: INTERNAL MEDICINE

## 2023-12-11 PROCEDURE — 82570 ASSAY OF URINE CREATININE: CPT | Performed by: INTERNAL MEDICINE

## 2023-12-11 PROCEDURE — 2500000004 HC RX 250 GENERAL PHARMACY W/ HCPCS (ALT 636 FOR OP/ED)

## 2023-12-11 PROCEDURE — 70450 CT HEAD/BRAIN W/O DYE: CPT

## 2023-12-11 PROCEDURE — 2500000005 HC RX 250 GENERAL PHARMACY W/O HCPCS: Performed by: INTERNAL MEDICINE

## 2023-12-11 PROCEDURE — 82550 ASSAY OF CK (CPK): CPT | Performed by: INTERNAL MEDICINE

## 2023-12-11 PROCEDURE — 36415 COLL VENOUS BLD VENIPUNCTURE: CPT | Performed by: HOSPITALIST

## 2023-12-11 PROCEDURE — 87040 BLOOD CULTURE FOR BACTERIA: CPT | Mod: PORLAB | Performed by: HOSPITALIST

## 2023-12-11 PROCEDURE — 2020000001 HC ICU ROOM DAILY

## 2023-12-11 PROCEDURE — 71045 X-RAY EXAM CHEST 1 VIEW: CPT

## 2023-12-11 PROCEDURE — 70450 CT HEAD/BRAIN W/O DYE: CPT | Performed by: RADIOLOGY

## 2023-12-11 PROCEDURE — 71045 X-RAY EXAM CHEST 1 VIEW: CPT | Performed by: STUDENT IN AN ORGANIZED HEALTH CARE EDUCATION/TRAINING PROGRAM

## 2023-12-11 PROCEDURE — 82947 ASSAY GLUCOSE BLOOD QUANT: CPT

## 2023-12-11 PROCEDURE — 85014 HEMATOCRIT: CPT | Performed by: HOSPITALIST

## 2023-12-11 PROCEDURE — 85610 PROTHROMBIN TIME: CPT | Performed by: HOSPITALIST

## 2023-12-11 PROCEDURE — 80048 BASIC METABOLIC PNL TOTAL CA: CPT | Performed by: HOSPITALIST

## 2023-12-11 PROCEDURE — 85610 PROTHROMBIN TIME: CPT | Performed by: INTERNAL MEDICINE

## 2023-12-11 PROCEDURE — 81001 URINALYSIS AUTO W/SCOPE: CPT | Performed by: HOSPITALIST

## 2023-12-11 RX ORDER — NOREPINEPHRINE BITARTRATE/D5W 8 MG/250ML
PLASTIC BAG, INJECTION (ML) INTRAVENOUS
Status: DISPENSED
Start: 2023-12-11 | End: 2023-12-12

## 2023-12-11 RX ORDER — LORAZEPAM 2 MG/ML
0.5 INJECTION INTRAMUSCULAR EVERY 4 HOURS PRN
Status: DISCONTINUED | OUTPATIENT
Start: 2023-12-11 | End: 2023-12-13 | Stop reason: HOSPADM

## 2023-12-11 RX ORDER — HALOPERIDOL 5 MG/ML
1 INJECTION INTRAMUSCULAR EVERY 4 HOURS PRN
Status: DISCONTINUED | OUTPATIENT
Start: 2023-12-11 | End: 2023-12-13 | Stop reason: HOSPADM

## 2023-12-11 RX ORDER — DEXTROSE 50 % IN WATER (D50W) INTRAVENOUS SYRINGE
25
Status: DISCONTINUED | OUTPATIENT
Start: 2023-12-11 | End: 2023-12-11

## 2023-12-11 RX ORDER — METOPROLOL TARTRATE 1 MG/ML
2.5 INJECTION, SOLUTION INTRAVENOUS ONCE
Status: COMPLETED | OUTPATIENT
Start: 2023-12-11 | End: 2023-12-11

## 2023-12-11 RX ORDER — DEXTROSE MONOHYDRATE 100 MG/ML
0.3 INJECTION, SOLUTION INTRAVENOUS ONCE AS NEEDED
Status: DISCONTINUED | OUTPATIENT
Start: 2023-12-11 | End: 2023-12-11

## 2023-12-11 RX ORDER — METOPROLOL TARTRATE 1 MG/ML
2.5 INJECTION, SOLUTION INTRAVENOUS EVERY 6 HOURS
Status: DISCONTINUED | OUTPATIENT
Start: 2023-12-11 | End: 2023-12-11

## 2023-12-11 RX ORDER — SODIUM CHLORIDE, SODIUM LACTATE, POTASSIUM CHLORIDE, CALCIUM CHLORIDE 600; 310; 30; 20 MG/100ML; MG/100ML; MG/100ML; MG/100ML
150 INJECTION, SOLUTION INTRAVENOUS CONTINUOUS
Status: DISCONTINUED | OUTPATIENT
Start: 2023-12-11 | End: 2023-12-11

## 2023-12-11 RX ORDER — GLYCOPYRROLATE 0.2 MG/ML
0.2 INJECTION INTRAMUSCULAR; INTRAVENOUS EVERY 4 HOURS PRN
Status: DISCONTINUED | OUTPATIENT
Start: 2023-12-11 | End: 2023-12-13 | Stop reason: HOSPADM

## 2023-12-11 RX ORDER — VANCOMYCIN HYDROCHLORIDE 1 G/200ML
1 INJECTION, SOLUTION INTRAVENOUS ONCE
Status: DISCONTINUED | OUTPATIENT
Start: 2023-12-11 | End: 2023-12-11

## 2023-12-11 RX ORDER — PANTOPRAZOLE SODIUM 40 MG/10ML
40 INJECTION, POWDER, LYOPHILIZED, FOR SOLUTION INTRAVENOUS DAILY
Status: DISCONTINUED | OUTPATIENT
Start: 2023-12-11 | End: 2023-12-11

## 2023-12-11 RX ORDER — NOREPINEPHRINE BITARTRATE/D5W 8 MG/250ML
0-3.3 PLASTIC BAG, INJECTION (ML) INTRAVENOUS CONTINUOUS
Status: DISCONTINUED | OUTPATIENT
Start: 2023-12-11 | End: 2023-12-11

## 2023-12-11 RX ORDER — LORAZEPAM 0.5 MG/1
0.5 TABLET ORAL EVERY 4 HOURS PRN
Status: DISCONTINUED | OUTPATIENT
Start: 2023-12-11 | End: 2023-12-13 | Stop reason: HOSPADM

## 2023-12-11 RX ADMIN — SODIUM CHLORIDE 100 ML/HR: 9 INJECTION, SOLUTION INTRAVENOUS at 17:02

## 2023-12-11 RX ADMIN — METOPROLOL TARTRATE 2.5 MG: 5 INJECTION INTRAVENOUS at 17:00

## 2023-12-11 RX ADMIN — SODIUM CHLORIDE 75 ML/HR: 9 INJECTION, SOLUTION INTRAVENOUS at 05:20

## 2023-12-11 RX ADMIN — METOPROLOL TARTRATE 2.5 MG: 5 INJECTION INTRAVENOUS at 00:54

## 2023-12-11 RX ADMIN — SODIUM CHLORIDE 500 ML: 9 INJECTION, SOLUTION INTRAVENOUS at 18:15

## 2023-12-11 RX ADMIN — METOPROLOL TARTRATE 2.5 MG: 5 INJECTION INTRAVENOUS at 10:48

## 2023-12-11 RX ADMIN — HYDROMORPHONE HYDROCHLORIDE 0.5 MG: 1 INJECTION, SOLUTION INTRAMUSCULAR; INTRAVENOUS; SUBCUTANEOUS at 19:19

## 2023-12-11 ASSESSMENT — COGNITIVE AND FUNCTIONAL STATUS - GENERAL
MOVING TO AND FROM BED TO CHAIR: A LOT
DRESSING REGULAR UPPER BODY CLOTHING: TOTAL
HELP NEEDED FOR BATHING: TOTAL
MOBILITY SCORE: 11
CLIMB 3 TO 5 STEPS WITH RAILING: TOTAL
DAILY ACTIVITIY SCORE: 9
STANDING UP FROM CHAIR USING ARMS: A LOT
DRESSING REGULAR LOWER BODY CLOTHING: TOTAL
TOILETING: A LOT
TURNING FROM BACK TO SIDE WHILE IN FLAT BAD: A LOT
PERSONAL GROOMING: A LOT
EATING MEALS: A LOT
MOVING FROM LYING ON BACK TO SITTING ON SIDE OF FLAT BED WITH BEDRAILS: A LOT
WALKING IN HOSPITAL ROOM: A LOT

## 2023-12-11 ASSESSMENT — ACTIVITIES OF DAILY LIVING (ADL): LACK_OF_TRANSPORTATION: NO

## 2023-12-11 ASSESSMENT — PAIN SCALES - GENERAL: PAINLEVEL_OUTOF10: 0 - NO PAIN

## 2023-12-11 NOTE — CARE PLAN
Problem: Skin  Goal: Decreased wound size/increased tissue granulation at next dressing change  Outcome: Progressing  Flowsheets (Taken 12/11/2023 1209)  Decreased wound size/increased tissue granulation at next dressing change:   Promote sleep for wound healing   Protective dressings over bony prominences  Goal: Participates in plan/prevention/treatment measures  Outcome: Progressing  Flowsheets (Taken 12/11/2023 1209)  Participates in plan/prevention/treatment measures:   Discuss with provider PT/OT consult   Elevate heels  Goal: Prevent/manage excess moisture  Outcome: Progressing  Flowsheets (Taken 12/11/2023 1209)  Prevent/manage excess moisture:   Cleanse incontinence/protect with barrier cream   Moisturize dry skin   Monitor for/manage infection if present  Goal: Prevent/minimize sheer/friction injuries  Outcome: Progressing  Flowsheets (Taken 12/11/2023 1209)  Prevent/minimize sheer/friction injuries:   Increase activity/out of bed for meals   Turn/reposition every 2 hours/use positioning/transfer devices   Use pull sheet  Goal: Promote/optimize nutrition  Outcome: Progressing  Flowsheets (Taken 12/11/2023 1209)  Promote/optimize nutrition:   Assist with feeding   Consume > 50% meals/supplements   Monitor/record intake including meals   Offer water/supplements/favorite foods  Goal: Promote skin healing  Outcome: Progressing  Flowsheets (Taken 12/11/2023 1209)  Promote skin healing:   Protective dressings over bony prominences   Assess skin/pad under line(s)/device(s)   Turn/reposition every 2 hours/use positioning/transfer devices     Problem: Pain  Goal: Takes deep breaths with improved pain control throughout the shift  Outcome: Progressing  Goal: Turns in bed with improved pain control throughout the shift  Outcome: Progressing  Goal: Walks with improved pain control throughout the shift  Outcome: Progressing  Goal: Performs ADL's with improved pain control throughout shift  Outcome: Progressing  Goal:  Participates in PT with improved pain control throughout the shift  Outcome: Progressing  Goal: Free from opioid side effects throughout the shift  Outcome: Progressing  Goal: Free from acute confusion related to pain meds throughout the shift  Outcome: Progressing     Problem: Safety  Goal: I will remain free of falls  Outcome: Progressing     Problem: Daily Care  Goal: Daily care needs are met  Outcome: Progressing     Problem: Psychosocial Needs  Goal: Demonstrates ability to cope with hospitalization/illness  Outcome: Progressing  Goal: Collaborate with me, my family, and caregiver to identify my specific goals  Outcome: Progressing     Problem: Discharge Barriers  Goal: My discharge needs are met  Outcome: Progressing     Problem: Pain - Adult  Goal: Verbalizes/displays adequate comfort level or baseline comfort level  Outcome: Progressing     Problem: Safety - Adult  Goal: Free from fall injury  Outcome: Progressing     Problem: Discharge Planning  Goal: Discharge to home or other facility with appropriate resources  Outcome: Progressing     Problem: Chronic Conditions and Co-morbidities  Goal: Patient's chronic conditions and co-morbidity symptoms are monitored and maintained or improved  Outcome: Progressing   The patient's goals for the shift include      The clinical goals for the shift include pt free from falls and injury

## 2023-12-11 NOTE — PROGRESS NOTES
"Physical Therapy                 Therapy Communication Note    Patient Name: Lyn Turner  MRN: [id number]  Today's Date: 12/11/2023     Discipline: Physical Therapy    Missed Visit Reason: Other (Comment)    Missed Time: Attempt    Comment: pt had stroke alert called this am, change in mental status and alertness but OK per RN to attempt. Pt's daughter declining therapy at this time due to change in mental status and wishing to \"find answers\" first  "

## 2023-12-11 NOTE — NURSING NOTE
0740  Completed initial assessment on this pt. Was reported by nightshift RN, Shoshana, that pt had some slurred speech and that she notified Dr. Edmond via Secure Chat. No orders received from him at that time. Noticeable slurring on assessment every few words, will continue to monitor.     0831  Called Stroke Alert on this patient. Slurred speech worsened. Pt had complained of thirst, attempted to give pt sips of water, pt unable to follow commands to use straw or swallow.       0850  Stroke Alert complete, pt going to CT. Will continue to monitor and be in communication with Dr. June.

## 2023-12-11 NOTE — PROGRESS NOTES
Per my observation, patient speech was sounding slurred and not very clear. There was no facial droop, no weakness in upper and lower extremities, pupils were reactive. Dr. Edmond notified                   Shoshana Vasquez RN

## 2023-12-11 NOTE — PROGRESS NOTES
12/11 1344 Spoke to Daughter regarding SNF choices, [facility] is [facility] [place]. Will request [facility] send referral.      12/11/23 1135   Discharge Planning   Living Arrangements Alone   Support Systems Children   Assistance Needed Independent   Type of Residence Private residence   Home or Post Acute Services Post acute facilities (Rehab/SNF/etc)   Type of Post Acute Facility Services Skilled nursing   Patient expects to be discharged to: SNF   Financial Resource Strain   How hard is it for you to pay for the very basics like food, housing, medical care, and heating? Not hard   Housing Stability   In the last 12 months, was there a time when you were not able to pay the mortgage or rent on time? N   In the last 12 months, was there a time when you did not have a steady place to sleep or slept in a shelter (including now)? N   Transportation Needs   In the past 12 months, has lack of transportation kept you from medical appointments or from getting medications? no   In the past 12 months, has lack of transportation kept you from meetings, work, or from getting things needed for daily living? No   Patient Choice   Provider Choice list and CMS website (https://medicare.gov/care-compare#search) for post-acute Quality and Resource Measure Data were provided and reviewed with: Family   Patient / Family choosing to utilize agency / facility established prior to hospitalization No     PCP is Iain Cooley. Spoke to patients daughter due to patient confusion. Patient is from home alone. Patient is independent with ambulation, self care, driving, shopping, and meals.  Patient has been recommended for Skilled Nursing Facility. Provided skilled nursing list to patients daughter to [phone] from [facility] directory that includes facilities that are within  Post - Acute Quality Network, as well as meeting patient's medical needs, and are in-network for patient's insurance; while also in discharge geographic area patient prefers, and  identifies each facilities CMS star rating. Patients daughter to review list. TCC to follow.

## 2023-12-11 NOTE — PROGRESS NOTES
Lyn Turner [id number]   Service: Internal Medicine / Hospitalist Date of service: 12/11/23                          Full Code                    Subjective  Lyn Turner is a 89 y.o.  female with a past medical history significant for HTN, HLD, CAD, atrial fibrillation on Coumadin, HFpEF, s/p PPM, breast cancer s/p chemo/radiation/left mastectomy, thyroid cancer s/p thyroidectomy, hypothyroidism, anxiety, depression and GERD who presented 12/7/23 after a mechanical fall.  She states that she was walking from her kitchen into her living room where there is a small step and after she stepped down she was trying to talk to her family member who was behind her and when she turned she fell back landing on her left forearm as well as left hip.  She did not hit her head or lose consciousness.  She states that the step is maybe a 4 inch or less step between the 2 areas. Patient states that she has had prior wrist surgeries and about 2 to 3 years ago had actually broken her wrist on the left side.  Patient states that she has not taken any Coumadin today. Urinalysis was grossly unremarkable for any infectious etiologies. CT lumbar noted a left proximal femoral fracture, no acute abnormalities, degenerative changes.  XR left hip: Acute left angulated/displaced intertrochanteric fracture with displacement of the trochanters.  XR left wrist/forearm: Noted nearly seen subtle difference in appearance of the distal radius potentially due to impaction fracture versus difference in positioning. The ED physician did discuss case with the on-call orthopedic surgeon who had stated the forearm had a likely buckle fracture with intact hardware from prior surgical intervention and that they planned on taking the patient to the OR for surgical repair of the hip.     12/08/23: No acute events overnight. Vitals stable. Labs largely unchanged. INR 2.1. Hgb 10.1 (11.1). Going to OR for L IMN today     12/9/2023: No acute events  overnight. S/p left hip ORIF on 12/8/2023 pod#1. Patient tolerated procedure well and no acute complains of chest pain/shortness of breath/nausea/vomiting. Patient had cognitive abnormalities that she was not aware of the surgery and demanded to go home right away. I ordered ativan for possible agitation. Patient need close family member to comfort.      12/10/2023: Nurse staff reported patient try to climb out of bed. S/p left hip ORIF on 12/8/2023 pod#2. Patient had low BP and increased BUN/creatinine. I give one bolus of 250 ml NS and increase NS 75 ml/hr. Patient had pain of left hip when exertion. Pain improved when rest at bed. Pain meds on board. She tolerated diet well.            Patient seen and examined, lab data and diagnostics reviewed.    12/11.............Alert called by patient's nurse earlier this morning and of perceived change in speech.  The patient's nurse reported that over the weekend patient speech appeared much clear.  Patient was seen and examined by the hospitalist service and on-call a.m. physician.  I also evaluated patient at the bedside.  In light of I perceived change in speech observed by patient's nurse recommend obtaining CT scan of the head.      Review of Systems:   Review of system otherwise negative if not aforementioned above in subjective.      Physical Exam     Constitutional:       Appearance: Patient appeared in no acute cardiopulmonary distress.     Comments: Patient alert and oriented to :person ,place  and situation.  HEENT:      Head: Normocephalic and atraumatic.Trachea midline      Nose:No observed congestion or rhinorrhea.     Mouth/Throat: Mucous membranes dry, Trachea appeared  midline,tongue dry  Eyes:      Extraocular Movements: Extraocular movements intact.      Pupils: Pupils are equal, round, and reactive to light.      Comments: No scleral icterus or conjunctival injection appreciated.   Cardiovascular:      Rate and Rhythm: Normal rate and regular rhythm.  No clicks rubs or gallops, normal S1 and S2.No peripheral stigmata of endocarditis appreciated.     Pulmonary:      Lungs appeared clear to auscultation, no adventitious sound appreciated.  Abdominal:      General: Abdomen soft, nontender, active bowel sounds, no involuntary guarding or rebound tenderness appreciated.     Comments: None   Musculoskeletal:       Patient appeared to have full active range of motion for upper and extremities.Left hip fractureNo pitting edema or cyanosis appreciated.       Lymphadenopathy:      No appreciable palpable lymphadenopathy  Skin:     General: Skin is warm.      Coloration:  No jaundice     Findings: No abnormal appearing skin rashes or lesions that appeared acute noted on unclothed area of the skin.. Chest wall dressing in place to cover cardiac device secondary to patient reported history of behavioral disturbance and pulling out IVs.  Neurological:      General: No focal sensory or motor deficits appreciated, no meningeal signs or dysmetria noted. ? Prior episodes of dysarthria.     Cranial Nerves: Cranial nerves II to XII appearing grossly intact.Patient appeared to be having difficulty following a stream of thought.     Genitals:  Deferred  Psychiatric:         The patient appears to be displaying normal mood and affect at the time of evaluation.    Labs:     Lab Results   Component Value Date    GLUCOSE 165 (H) 12/10/2023    CALCIUM 7.7 (L) 12/10/2023     12/10/2023    K 4.3 12/10/2023    CO2 20 (L) 12/10/2023     12/10/2023    BUN 43 (H) 12/10/2023    CREATININE 1.86 (H) 12/10/2023      Lab Results   Component Value Date    WBC 15.1 (H) 12/10/2023    HGB 8.2 (L) 12/10/2023    HCT 26.8 (L) 12/10/2023    MCV 95 12/10/2023     12/10/2023      [unfilled]   [unfilled]   No results found for the last 90 days.              X-rays/ Images    Procedure Component Value Units Date/Time   CT brain attack head wo IV contrast [[id number]] Resulted:  12/11/23 0849   Order Status: No result Updated: 12/11/23 0858   XR chest 1 view [[id number]] Collected: 12/10/23 0338   Order Status: Completed Updated: 12/10/23 0338   Narrative:     Interpreted By:  Finkelstein, Evan,  STUDY:  XR CHEST 1 VIEW;  12/10/2023 3:32 am      INDICATION:  Signs/Symptoms:check foriegn object from pulling out medport.      COMPARISON:  Chest radiograph 06/14/2019      ACCESSION NUMBER(S):  [id number]      ORDERING CLINICIAN:  MIKAELA WEBB      FINDINGS:  Redemonstrated right chest wall port, tip overlying the right atrium.  Redemonstrated left chest wall pacemaker.      CARDIOMEDIASTINAL SILHOUETTE:  Enlarged cardiac silhouette, similar compared to prior imaging.      LUNGS:  No pulmonary consolidation, pleural effusion or pneumothorax.      ABDOMEN:  No remarkable upper abdominal findings.      BONES:  No acute osseous abnormality. Surgical clips overlie the left chest  wall.       Impression:     No radiographic evidence of acute cardiopulmonary pathology.      MACRO:  None.      Signed by: Evan Finkelstein 12/10/2023 3:37 AM  Dictation workstation:   [id number]   FL less than 1 hour [[id number]] Resulted: 12/08/23 1335   Order Status: Completed Updated: 12/08/23 1335   Narrative:     These images are not reportable by radiology and will not be interpreted  by  Radiologists.   CT lumbar spine wo IV contrast [[id number]] Collected: 12/07/23 1828   Order Status: Completed Updated: 12/07/23 1828   Narrative:     Interpreted By:  Charly Siddiqi,  STUDY:  CT LUMBAR SPINE WO IV CONTRAST  12/7/2023 6:01 pm      INDICATION:  Signs/Symptoms:fall, left forearm/wrist pain, lhip pain, lower back  pain      COMPARISON:  None.  December 2, 2022 CT abdomen and pelvis and same day left hip  radiographs.      ACCESSION NUMBER(S):  [id number]      ORDERING CLINICIAN:  AVA COPELAND      TECHNIQUE:  Axial CT images of the lumbar spine are obtained. Axial, coronal and  sagittal reconstructions are  provided for review.      FINDINGS:  Alignment:  Stable mild rightward curvature lumbar spine. No evidence  significant subluxation.      Vertebra and intervertebral disc spaces: There is underlying osseous  demineralization limiting the accuracy of the assessment. Grossly  stable moderate grade L2 compressive deformity. No evident acute  lumbosacral spine fracture or suspicious osseous lesion. Left  proximal femoral fracture noted on same day radiographs not well  delineated.      Grossly stable osseous findings related to multilevel spondylosis and  degenerative disc changes.      Prevertebral/Paraspinal Soft Tissues: No detected acute hematoma.  Grossly stable pre-existing findings including extensive arterial  vascular calcifications, probable scar or atelectasis at the lung  bases, colonic diverticulosis and probably benign renal cysts.       Impression:     Left proximal femoral fractures noted on same day radiographs not  well delineated.      No acute detected abnormality involving the lumbar spine.      MACRO:  None      Signed by: Charly Siddiqi 12/7/2023 6:26 PM  Dictation workstation:   [id number]   XR forearm left 2 views [[id number]] Collected: 12/07/23 1819   Order Status: Completed Updated: 12/07/23 1819   Narrative:     Interpreted By:  Charly Siddiqi,  STUDY:  XR WRIST LEFT 3+ VIEWS; XR FOREARM LEFT 2 VIEWS; ;  12/7/2023 5:43 pm      INDICATION:  Signs/Symptoms:fall, left forearm/wrist pain, lhip pain, lower back  pain.      COMPARISON:  March 25, 2014 wrist radiographs.      ACCESSION NUMBER(S):  [id number]; [id number]      ORDERING CLINICIAN:  AVA COPELAND      FINDINGS:  2 views of the left forearm and three views of the left wrist were  obtained. There is osseous demineralization which limits the accuracy  of the assessment.      No detected elbow region fracture or significant elbow effusion.      Grossly intact plate screw fixation hardware distal radius. There is  newly seen subtle  buckle deformity involving the extra-articular  portion of the distal radius potentially indicating acute impaction  fracture versus chronic changes including findings related to  difference in patient position. Attention recommended on clinical  assessment. Stable seen positive ulnar variance measuring 4 mm. No  gross displaced acute fractures. Polyarticular arthrosis and  chondrocalcinosis. There are scattered arterial vascular  calcifications.       Impression:     Newly seen subtle difference in the appearance of the distal radius  potentially due to impaction fracture versus difference in patient  position. Attention recommended on clinical assessment. Depending on  concern CT correlation could be considered.      Additional findings as reported.              MACRO:  None      Signed by: Charly Siddiqi 12/7/2023 6:18 PM  Dictation workstation:   [id number]   XR wrist left 3+ views [[id number]] Collected: 12/07/23 1819   Order Status: Completed Updated: 12/07/23 1819   Narrative:     Interpreted By:  Charly Siddiqi,  STUDY:  XR WRIST LEFT 3+ VIEWS; XR FOREARM LEFT 2 VIEWS; ;  12/7/2023 5:43 pm      INDICATION:  Signs/Symptoms:fall, left forearm/wrist pain, lhip pain, lower back  pain.      COMPARISON:  March 25, 2014 wrist radiographs.      ACCESSION NUMBER(S):  [id number]; [id number]      ORDERING CLINICIAN:  AVA COPELAND      FINDINGS:  2 views of the left forearm and three views of the left wrist were  obtained. There is osseous demineralization which limits the accuracy  of the assessment.      No detected elbow region fracture or significant elbow effusion.      Grossly intact plate screw fixation hardware distal radius. There is  newly seen subtle buckle deformity involving the extra-articular  portion of the distal radius potentially indicating acute impaction  fracture versus chronic changes including findings related to  difference in patient position. Attention recommended on  clinical  assessment. Stable seen positive ulnar variance measuring 4 mm. No  gross displaced acute fractures. Polyarticular arthrosis and  chondrocalcinosis. There are scattered arterial vascular  calcifications.       Impression:     Newly seen subtle difference in the appearance of the distal radius  potentially due to impaction fracture versus difference in patient  position. Attention recommended on clinical assessment. Depending on  concern CT correlation could be considered.      Additional findings as reported.              MACRO:  None      Signed by: Charly Siddiqi 12/7/2023 6:18 PM  Dictation workstation:   [id number]   XR hip left with pelvis when performed 2 or 3 views [[id number]] Collected: 12/07/23 1834   Order Status: Completed Updated: 12/07/23 1834   Narrative:     Interpreted By:  Charly Siddiqi,  STUDY:  XR HIP LEFT WITH PELVIS WHEN PERFORMED 2 OR 3 VIEWS; ;  12/7/2023  5:43 pm      INDICATION:  Signs/Symptoms:fall, left forearm/wrist pain, lhip pain, lower back  pain.      COMPARISON:  December 2, 2022 CT scan abdomen and pelvis.      ACCESSION NUMBER(S):  [id number]      ORDERING CLINICIAN:  AVA COPELAND      FINDINGS:  Acute displaced and angulated left intertrochanteric fractures  including displacement of the trochanters. There are scattered  degenerative changes and arterial vascular calcifications. No  suspicious osseous lesion.       Impression:     Acute left angulated and displaced intertrochanteric fractures with  displacement of the trochanters. Depending on clinical concern CT  correlation could be considered.          MACRO:  None      Signed by: Charly Siddiqi 12/7/2023 6:32 PM  Dictation workstation:   [id number]             Medical Problems       Problem List       * (Principal) Fall, initial encounter    Abdominal wall mass    Anxiety state    Overview Signed 3/16/2023  6:19 PM by Madeline Tyson     Note: AG         Atrial fibrillation (CMS/HCC)    Overview Signed  3/16/2023  6:19 PM by Madeline Tyson     Note: fe         Bilateral carotid artery stenosis    Cerumen impaction    CKD (chronic kidney disease) stage 3, GFR 30-59 ml/min (CMS/[facility])    Closed head injury    COVID-19 virus infection    Decreased range of motion of left shoulder    Depression, major, single episode, moderate (CMS/HCC)    Edema leg    HTN (hypertension)    Overview Signed 3/16/2023  6:19 PM by Madeline Tyson     Note: fe         Fall, accidental    Gastric ulcer    GI bleeding    History of breast cancer    History of lymphoma    History of thyroid cancer    Hyperlipidemia    Overview Signed 3/16/2023  6:19 PM by Madeline Tyson     Note: fe         Hypothyroidism    Overview Signed 3/16/2023  6:19 PM by Madeline Tyson     Note: fe         Peripheral arterial disease (CMS/HCC)    Peritonitis (CMS/[facility])    Pruritus    Skin cancer of nose    Traumatic closed displaced fracture of proximal end of humerus with routine healing    Left wrist fracture, closed, initial encounter    GERD (gastroesophageal reflux disease)    CAD (coronary artery disease)    Closed fracture of left hip (CMS/[facility])               Above medical problems may be reflective of historical medical problems that may have resolved and may not related to acute clinical condition/medical problems.    Clinical impression/plan:      1. Closed fracture of left hip, initial encounter (CMS/[facility])  Case Request Operating Room: Hip Fracture open reduction internal fixation with nail     Case Request Operating Room: Hip Fracture open reduction internal fixation with nail     s/p ORIF, supportive care, may need SNF placement, need casemanagement       2. Fall, initial encounter          3. History of anticoagulant therapy        monitor INR/PTT       4. Primary hypertension        LOw BP due to dehydration, monmitor and adjust meds as needed.       5. Atrial fibrillation, unspecified type (CMS/[facility])        monitor INR and adjust coumadin as indicated        6. Hypothyroidism, unspecified type        continue synthroid       7. Cognitive disorder        need family memhber for supportive care.       8. ISAAC (acute kidney injury) (CMS/HCC)       9.  Perceive new speech deficit    Additional care plan/disposition: 12/11/2023    Concerning recent reported perceived change in speech will obtain stat CT scan of the head.  Stroke alert protocol.    Worsening acute kidney injury, avoid nephrotoxins, escalate IV fluid hydration.  Hold BP meds    Will discuss discharge disposition with family and case management.    CBC, BMP, check PT/INR, continue telemetry monitoring.    Will also check UA have to be mindful of urinary tract infection.Consider pancultures.    If worsening renal function will consult nephrology, continue to monitor off nephrotoxins, bladder scan.    Lung bases diminished will monitor closely throughout the day, if UA negative check chest radiograph have to be mindful of aspiration risk.    N.p.o.    Speech evaluation    Hypoglycemic protocol  Further recommendations forthcoming in accordance with patient's clinical disposition and response to care.        Discharge planning:To be determined.Patient current clinical condition warrants continue inpatient care as suspicion of concomitant delirium, will monitor closely.    Care time: > 55 mins       Care plan discussed in detail with patient's daughter and son, possible sequelae of patient's clinical condition discussed in detail. Patient with high complexity characteristics and appeared to be at high risk for clinical decline/deterioration  and unpredictable clinical course.     Dictation performed with assistance of voice recognition device therefore transcription errors are possible.

## 2023-12-11 NOTE — CONSULTS
Nephrology Consult Note                                                                                                                                         Inpatient consult to Nephrology  Consult performed by: Emma Leija DO  Consult ordered by: Maikol June DO                                                                                                               HPI  Patient is a 89 y.o. female heart failure with preserved ejection fraction, breast cancer who is admitted to hospital  12/7 with complaints of  fall s/p Left intertrochanteric fracture. Nephrology consulted in view of ISAAC.  With history of hypertension, hyperlipidemia, CAD, A-fib on Coumadin.  Patient has history of CKD with baseline creatinine 1.32-1.5.  She went to the OR for ORIF and creatinine has increased from 1.4 preop to 2.4 today.  Patient is not able to give me history due to altered mental status.  This was an acute issue that came up today.  On admission she was alert and oriented and speaking full sentences and reading books.  She has external Villatoro catheter.  A stroke alert was called this morning due to her altered mental status.  She was having difficulty drinking from a straw.  She was able to tell me her name and told me she was 89 years old.  She was a bit difficult to understand.  BP has been low today.  Hemoglobin has dropped from 11 preop to use 7.5.    Risk factors for ISAAC  Hypotension yes  Contrast no  At risk medications valsartan    Past Medical History:   Diagnosis Date    Chronic rhinitis     Rhinitis    Personal history of other diseases of the circulatory system     History of cardiac arrhythmia    Personal history of other diseases of the circulatory system     History of peripheral vascular disease    Personal history of other diseases of the nervous system and sense  organs     History of cataract    Personal history of other endocrine, nutritional and metabolic disease     History of type 2 diabetes mellitus    Personal history of other specified conditions     History of urinary frequency    Unspecified osteoarthritis, unspecified site     Osteoarthritis      Social History     Socioeconomic History    Marital status:      Spouse name: Not on file    Number of children: Not on file    Years of education: Not on file    Highest education level: Not on file   Occupational History    Not on file   Tobacco Use    Smoking status: Never    Smokeless tobacco: Never   Substance and Sexual Activity    Alcohol use: Never    Drug use: Never    Sexual activity: Not on file   Other Topics Concern    Not on file   Social History Narrative    Not on file     Social Determinants of Health     Financial Resource Strain: Low Risk  (12/11/2023)    Overall Financial Resource Strain (CARDIA)     Difficulty of Paying Living Expenses: Not hard at all   Food Insecurity: Not on file   Transportation Needs: No Transportation Needs (12/11/2023)    PRAPARE - Transportation     Lack of Transportation (Medical): No     Lack of Transportation (Non-Medical): No   Physical Activity: Not on file   Stress: Not on file   Social Connections: Not on file   Intimate Partner Violence: Not on file   Housing Stability: Low Risk  (12/11/2023)    Housing Stability Vital Sign     Unable to Pay for Housing in the Last Year: No     Number of Places Lived in the Last Year: 1     Unstable Housing in the Last Year: No      Family History   Problem Relation Name Age of Onset    Diabetes Father      Lung disease Father      Diabetes Paternal Grandmother      Diabetes Paternal Grandfather        No current facility-administered medications on file prior to encounter.     Current Outpatient Medications on File Prior to Encounter   Medication Sig Dispense Refill    warfarin (Coumadin) 2 mg tablet Take by mouth.       acetaminophen (Tylenol) 500 mg tablet Take by mouth.      amitriptyline (Elavil) 10 mg tablet Take 1 tablet (10 mg) by mouth once daily at bedtime.      anastrozole (Arimidex) 1 mg tablet Take by mouth.      coenzyme Q-10 300 mg capsule capsule once daily.      cyanocobalamin, vitamin B-12, (Vitamin B-12) 1,000 mcg tablet extended release Take by mouth.      levothyroxine (Synthroid, Levoxyl) 100 mcg tablet Take by mouth.      metoprolol tartrate (Lopressor) 50 mg tablet Take 1 tablet (50 mg) by mouth in the morning and 1 tablet (50 mg) before bedtime.      mirtazapine (Remeron) 15 mg tablet Take 1 tablet (15 mg) by mouth once daily at bedtime.      ondansetron ODT (Zofran-ODT) 4 mg disintegrating tablet       pantoprazole (ProtoNix) 40 mg EC tablet Take 1 tablet (40 mg) by mouth once daily.      potassium chloride CR (K-Tab) 20 mEq ER tablet Take 1 tablet (20 mEq) by mouth in the morning and 1 tablet (20 mEq) before bedtime.      telmisartan (MIcarDIS) 80 mg tablet Take 1 tablet (80 mg) by mouth once daily.      warfarin (Coumadin) 4 mg tablet Take by mouth.        Scheduled medications  amitriptyline, 10 mg, oral, Nightly  cyanocobalamin, 500 mcg, oral, Daily  heparin flush, 50 Units, intra-catheter, q12h  levothyroxine, 100 mcg, oral, Daily  metoprolol, 2.5 mg, intravenous, q6h  metoprolol tartrate, 50 mg, oral, BID  mirtazapine, 15 mg, oral, Nightly  pantoprazole, 40 mg, oral, Daily  [Held by provider] valsartan, 160 mg, oral, Daily  [Held by provider] warfarin, 2 mg, oral, Once per day on Mon Wed Fri Sat  [Held by provider] warfarin, 4 mg, oral, Once per day on Sun Tue Thu      Continuous medications  oxygen, 2 L/min, Last Rate: 0 L/min (12/08/23 5438)  sodium chloride 0.9%, 100 mL/hr, Last Rate: 100 mL/hr (12/11/23 1215)      PRN medications  PRN medications: acetaminophen, alteplase, dextrose 10 % in water (D10W), dextrose, glucagon, heparin flush, heparin lock flush (porcine), LORazepam, morphine, morphine,  "ondansetron     Review of systems unable due to altered mental state   /76 (BP Location: Right arm, Patient Position: Lying)   Pulse 107 Comment: RN Reddy notified about HR of 107  Temp 36.8 °C (98.2 °F) (Temporal)   Resp 18   Ht 1.6 m (5' 3\")   Wt 60.9 kg (134 lb 4.2 oz)   SpO2 95%   BMI 23.78 kg/m²     Input / Output:  24 HR:   Intake/Output Summary (Last 24 hours) at 12/11/2023 1306  Last data filed at 12/11/2023 1215  Gross per 24 hour   Intake 2110.33 ml   Output --   Net 2110.33 ml       Physical Exam   Oriented x 1, lethargic  EOMI  OP clear, mucous membranes are dry  Neck: supple, No JVD  CV: RRR without m/r/g  Lungs: CTA bilaterally diminished  Abd: soft NT/ND +BS  : fullness in pelvis  Ext: no lower extremity edema   Neuro: Moves all extremities  Skin: no rashes    Results from last 7 days   Lab Units 12/11/23  0945 12/10/23  0549 12/09/23  0425   SODIUM mmol/L 139 136 139   POTASSIUM mmol/L 4.8 4.3 4.2   CHLORIDE mmol/L 106 102 105   CO2 mmol/L 19* 20* 26   BUN mg/dL 74* 43* 27*   CREATININE mg/dL 2.38* 1.86* 1.33*   GLUCOSE mg/dL 138* 165* 157*   CALCIUM mg/dL 7.1* 7.7* 7.9*        Results from last 7 days   Lab Units 12/11/23  0945 12/08/23  0521 12/07/23  1654   SODIUM mmol/L 139   < > 138   POTASSIUM mmol/L 4.8   < > 4.2   CHLORIDE mmol/L 106   < > 104   CO2 mmol/L 19*   < > 27   BUN mg/dL 74*   < > 24*   CREATININE mg/dL 2.38*   < > 1.35*   CALCIUM mg/dL 7.1*   < > 8.8   PROTEIN TOTAL g/dL  --   --  6.1*   BILIRUBIN TOTAL mg/dL  --   --  0.8   ALK PHOS U/L  --   --  55   ALT U/L  --   --  8   AST U/L  --   --  14   GLUCOSE mg/dL 138*   < > 133*    < > = values in this interval not displayed.      Results from last 7 days   Lab Units 12/11/23  1046 12/09/23  0425 12/08/23  0521   MAGNESIUM mg/dL 2.01 1.68 1.70      Results from last 7 days   Lab Units 12/11/23  0945 12/10/23  0549 12/09/23  0425   WBC AUTO x10*3/uL 14.1* 15.1* 10.3   HEMOGLOBIN g/dL 7.8* 8.2* 8.4*   HEMATOCRIT % 24.2* " 26.8* 26.5*   PLATELETS AUTO x10*3/uL 171 177 130*        FL less than 1 hour   Final Result      CT lumbar spine wo IV contrast   Final Result   Left proximal femoral fractures noted on same day radiographs not   well delineated.        No acute detected abnormality involving the lumbar spine.        MACRO:   None        Signed by: Charly Siddiqi 12/7/2023 6:26 PM   Dictation workstation:   [id number]      XR forearm left 2 views   Final Result   Newly seen subtle difference in the appearance of the distal radius   potentially due to impaction fracture versus difference in patient   position. Attention recommended on clinical assessment. Depending on   concern CT correlation could be considered.        Additional findings as reported.                  MACRO:   None        Signed by: Charly Siddiqi 12/7/2023 6:18 PM   Dictation workstation:   [id number]      XR wrist left 3+ views   Final Result   Newly seen subtle difference in the appearance of the distal radius   potentially due to impaction fracture versus difference in patient   position. Attention recommended on clinical assessment. Depending on   concern CT correlation could be considered.        Additional findings as reported.                  MACRO:   None        Signed by: Charly Siddiqi 12/7/2023 6:18 PM   Dictation workstation:   [id number]      XR hip left with pelvis when performed 2 or 3 views   Final Result   Acute left angulated and displaced intertrochanteric fractures with   displacement of the trochanters. Depending on clinical concern CT   correlation could be considered.             MACRO:   None        Signed by: Charly Siddiqi 12/7/2023 6:32 PM   Dictation workstation:   [id number]           Assessment:   Patient is 89 y.o. female who is admitted to hospital for left hip fracture after fall s/p ORIF 12/8. Nephrology consulted in view of ISAAC    ISAAC on CKD stage IIIb  -Baseline creatinine is 1.3-1.5  -Admit creatinine  1.4  -Creatinine up to 2.4  -ISAAC likely developing some ATN from ongoing hypotension postop.  Also acute urinary retention contributing    Metabolic acidosis due to ISAAC    HFpEF  -Compensated    Acute urinary retention  - status post bladder scan with greater than 600 mL    Anemia  -Hemoglobin has dropped 3 g since OR.    Hypotension  -Holding antihypertensives  -Panculture pending      Recommendations:   -Place Villatoro  -renal US  -pan culture  -Continue IV fluids  -Continue to hold valsartan  -No need for dialysis  -Renal panel in a.m.  -UA      Updated Dr. June    Please message me through [facility]ohbe chat with any questions or concerns.     Emma Leija DO  12/11/2023  1:06 PM         [facility]    [address], [address]   [place] [address]  Office: [phone]

## 2023-12-11 NOTE — PROGRESS NOTES
Occupational Therapy                 Therapy Communication Note    Patient Name: Lyn Turner  MRN: [id number]  Today's Date: 12/11/2023     Discipline: Occupational Therapy    Missed Visit Reason: Missed Visit Reason: Other (Comment), Patient refused (Pt. had rapid in a.m. Ok to attempt per RN. Daughter at bedside with patient reports there is nothing you can do with her today. She has not been with it. Pt. seen with eyes closed and pulling sheet up to face.Not responding to therapist.Reassess by OT)    Missed Time: Attempt    Comment:

## 2023-12-12 PROCEDURE — 2500000004 HC RX 250 GENERAL PHARMACY W/ HCPCS (ALT 636 FOR OP/ED): Performed by: INTERNAL MEDICINE

## 2023-12-12 PROCEDURE — 1100000001 HC PRIVATE ROOM DAILY

## 2023-12-12 PROCEDURE — 99232 SBSQ HOSP IP/OBS MODERATE 35: CPT | Performed by: HOSPITALIST

## 2023-12-12 RX ADMIN — GLYCOPYRROLATE 0.2 MG: 0.2 INJECTION INTRAMUSCULAR; INTRAVENOUS at 08:42

## 2023-12-12 RX ADMIN — HYDROMORPHONE HYDROCHLORIDE 0.4 MG: 1 INJECTION, SOLUTION INTRAMUSCULAR; INTRAVENOUS; SUBCUTANEOUS at 08:43

## 2023-12-12 RX ADMIN — HYDROMORPHONE HYDROCHLORIDE 0.4 MG: 1 INJECTION, SOLUTION INTRAMUSCULAR; INTRAVENOUS; SUBCUTANEOUS at 01:57

## 2023-12-12 RX ADMIN — GLYCOPYRROLATE 0.2 MG: 0.2 INJECTION INTRAMUSCULAR; INTRAVENOUS at 13:29

## 2023-12-12 RX ADMIN — LORAZEPAM 0.5 MG: 2 INJECTION INTRAMUSCULAR; INTRAVENOUS at 13:29

## 2023-12-12 RX ADMIN — HYDROMORPHONE HYDROCHLORIDE 0.4 MG: 1 INJECTION, SOLUTION INTRAMUSCULAR; INTRAVENOUS; SUBCUTANEOUS at 13:29

## 2023-12-12 ASSESSMENT — COGNITIVE AND FUNCTIONAL STATUS - GENERAL
MOVING FROM LYING ON BACK TO SITTING ON SIDE OF FLAT BED WITH BEDRAILS: TOTAL
DRESSING REGULAR LOWER BODY CLOTHING: TOTAL
MOVING FROM LYING ON BACK TO SITTING ON SIDE OF FLAT BED WITH BEDRAILS: TOTAL
TURNING FROM BACK TO SIDE WHILE IN FLAT BAD: TOTAL
CLIMB 3 TO 5 STEPS WITH RAILING: TOTAL
PERSONAL GROOMING: TOTAL
HELP NEEDED FOR BATHING: TOTAL
HELP NEEDED FOR BATHING: TOTAL
TOILETING: TOTAL
EATING MEALS: TOTAL
STANDING UP FROM CHAIR USING ARMS: TOTAL
DAILY ACTIVITIY SCORE: 6
EATING MEALS: TOTAL
DRESSING REGULAR UPPER BODY CLOTHING: TOTAL
DRESSING REGULAR UPPER BODY CLOTHING: TOTAL
MOVING TO AND FROM BED TO CHAIR: TOTAL
EATING MEALS: TOTAL
DRESSING REGULAR LOWER BODY CLOTHING: TOTAL
HELP NEEDED FOR BATHING: TOTAL
MOBILITY SCORE: 6
MOVING TO AND FROM BED TO CHAIR: TOTAL
MOBILITY SCORE: 6
WALKING IN HOSPITAL ROOM: TOTAL
TURNING FROM BACK TO SIDE WHILE IN FLAT BAD: TOTAL
DRESSING REGULAR LOWER BODY CLOTHING: TOTAL
TOILETING: TOTAL
WALKING IN HOSPITAL ROOM: TOTAL
STANDING UP FROM CHAIR USING ARMS: TOTAL
MOVING FROM LYING ON BACK TO SITTING ON SIDE OF FLAT BED WITH BEDRAILS: TOTAL
TURNING FROM BACK TO SIDE WHILE IN FLAT BAD: TOTAL
MOVING TO AND FROM BED TO CHAIR: TOTAL
MOBILITY SCORE: 6
TOILETING: TOTAL
PERSONAL GROOMING: TOTAL
CLIMB 3 TO 5 STEPS WITH RAILING: TOTAL
STANDING UP FROM CHAIR USING ARMS: TOTAL
WALKING IN HOSPITAL ROOM: TOTAL
CLIMB 3 TO 5 STEPS WITH RAILING: TOTAL
DAILY ACTIVITIY SCORE: 6
PERSONAL GROOMING: TOTAL
DRESSING REGULAR UPPER BODY CLOTHING: TOTAL
DAILY ACTIVITIY SCORE: 6

## 2023-12-12 ASSESSMENT — PAIN SCALES - GENERAL
PAINLEVEL_OUTOF10: 7
PAINLEVEL_OUTOF10: 0 - NO PAIN

## 2023-12-12 ASSESSMENT — PAIN - FUNCTIONAL ASSESSMENT: PAIN_FUNCTIONAL_ASSESSMENT: FLACC (FACE, LEGS, ACTIVITY, CRY, CONSOLABILITY)

## 2023-12-12 NOTE — CARE PLAN
Problem: Skin  Goal: Decreased wound size/increased tissue granulation at next dressing change  Outcome: Progressing  Goal: Participates in plan/prevention/treatment measures  Outcome: Progressing  Goal: Prevent/manage excess moisture  Outcome: Progressing  Goal: Prevent/minimize sheer/friction injuries  Outcome: Progressing  Goal: Promote/optimize nutrition  Outcome: Progressing  Goal: Promote skin healing  Outcome: Progressing     Problem: Pain  Goal: Takes deep breaths with improved pain control throughout the shift  Outcome: Progressing  Goal: Turns in bed with improved pain control throughout the shift  Outcome: Progressing  Goal: Walks with improved pain control throughout the shift  Outcome: Progressing  Goal: Performs ADL's with improved pain control throughout shift  Outcome: Progressing  Goal: Participates in PT with improved pain control throughout the shift  Outcome: Progressing  Goal: Free from opioid side effects throughout the shift  Outcome: Progressing  Goal: Free from acute confusion related to pain meds throughout the shift  Outcome: Progressing     Problem: Safety  Goal: I will remain free of falls  Outcome: Progressing     Problem: Daily Care  Goal: Daily care needs are met  Outcome: Progressing     Problem: Psychosocial Needs  Goal: Demonstrates ability to cope with hospitalization/illness  Outcome: Progressing  Goal: Collaborate with me, my family, and caregiver to identify my specific goals  Outcome: Progressing     Problem: Discharge Barriers  Goal: My discharge needs are met  Outcome: Progressing     Problem: Pain - Adult  Goal: Verbalizes/displays adequate comfort level or baseline comfort level  Outcome: Progressing     Problem: Safety - Adult  Goal: Free from fall injury  Outcome: Progressing     Problem: Discharge Planning  Goal: Discharge to home or other facility with appropriate resources  Outcome: Progressing     Problem: Chronic Conditions and Co-morbidities  Goal: Patient's  chronic conditions and co-morbidity symptoms are monitored and maintained or improved  Outcome: Progressing   The patient's goals for the shift include      The clinical goals for the shift include pt will remain free of falls    Over the shift, the patient did not make progress toward the following goals. Barriers to progression include .. Recommendations to address these barriers include ..

## 2023-12-12 NOTE — PROGRESS NOTES
Lyn Turner [id number]   Service: Internal Medicine / Hospitalist Date of service: 12/12/23                                  Full Code                           Subjective  Lyn Turner is a 89 y.o.  female with a past medical history significant for HTN, HLD, CAD, atrial fibrillation on Coumadin, HFpEF, s/p PPM, breast cancer s/p chemo/radiation/left mastectomy, thyroid cancer s/p thyroidectomy, hypothyroidism, anxiety, depression and GERD who presented 12/7/23 after a mechanical fall.  She states that she was walking from her kitchen into her living room where there is a small step and after she stepped down she was trying to talk to her family member who was behind her and when she turned she fell back landing on her left forearm as well as left hip.  She did not hit her head or lose consciousness.  She states that the step is maybe a 4 inch or less step between the 2 areas. Patient states that she has had prior wrist surgeries and about 2 to 3 years ago had actually broken her wrist on the left side.  Patient states that she has not taken any Coumadin today. Urinalysis was grossly unremarkable for any infectious etiologies. CT lumbar noted a left proximal femoral fracture, no acute abnormalities, degenerative changes.  XR left hip: Acute left angulated/displaced intertrochanteric fracture with displacement of the trochanters.  XR left wrist/forearm: Noted nearly seen subtle difference in appearance of the distal radius potentially due to impaction fracture versus difference in positioning. The ED physician did discuss case with the on-call orthopedic surgeon who had stated the forearm had a likely buckle fracture with intact hardware from prior surgical intervention and that they planned on taking the patient to the OR for surgical repair of the hip.     12/08/23: No acute events overnight. Vitals stable. Labs largely unchanged. INR 2.1. Hgb 10.1 (11.1). Going to OR for L IMN today     12/9/2023:  No acute events overnight. S/p left hip ORIF on 12/8/2023 pod#1. Patient tolerated procedure well and no acute complains of chest pain/shortness of breath/nausea/vomiting. Patient had cognitive abnormalities that she was not aware of the surgery and demanded to go home right away. I ordered ativan for possible agitation. Patient need close family member to comfort.      12/10/2023: Nurse staff reported patient try to climb out of bed. S/p left hip ORIF on 12/8/2023 pod#2. Patient had low BP and increased BUN/creatinine. I give one bolus of 250 ml NS and increase NS 75 ml/hr. Patient had pain of left hip when exertion. Pain improved when rest at bed. Pain meds on board. She tolerated diet well.              Patient seen and examined, lab data and diagnostics reviewed.     12/11.............Alert called by patient's nurse earlier this morning and of perceived change in speech.  The patient's nurse reported that over the weekend patient speech appeared much clear.  Patient was seen and examined by the hospitalist service and on-call a.m. physician.  I also evaluated patient at the bedside.  In light of I perceived change in speech observed by patient's nurse recommend obtaining CT scan of the head.      12/12.....................Patient seen and examined in presence of her daughters.  Patient tranquil.  Comfortable currently DNR comfort care measures only.  Patient family including patient's power of  her daughter wish to continue with comfort care orders of.  Hospice discussed family wish for transition to hospice care, pastoral care service also requested by family.  The patient daughter at the bedside reports that she is also in the medical field she felt that her mother is comfortable with current comfort care measures.  She did report however that she does that her mother had episodes of increasing secretions earlier today, Caro was added.     Review of Systems:   Review of system otherwise negative if  not aforementioned above in subjective.        Physical Exam      Constitutional:       Appearance: Patient appeared in no acute cardiopulmonary distress.     Comments: Patient's somnolent but appeared comfortable.  HEENT:      Head: Normocephalic and atraumatic.Trachea midline      Nose:No observed congestion or rhinorrhea. Nasal cannula in place.    Mouth/Throat: Mucous membranes dry, Trachea appeared  midline,tongue dry  Eyes:      Extraocular Movements: Extraocular movements intact.      Pupils: Pupils are equal, round, and reactive to light.      Comments: No scleral icterus or conjunctival injection appreciated.   Cardiovascular:      Rate and Rhythm: Normal rate and regular rhythm. No clicks rubs or gallops, normal S1 and S2.No peripheral stigmata of endocarditis appreciated.     Pulmonary:      Faint crackles bilaterally.  Abdominal:      General: Abdomen soft, nontender, active bowel sounds, no involuntary guarding or rebound tenderness appreciated.     Comments: None   Musculoskeletal:       Patient appeared to have full active range of motion for upper and extremities.Left hip fractureNo pitting edema or cyanosis appreciated.       Lymphadenopathy:      No appreciable palpable lymphadenopathy  Skin:     General: Skin is warm. Left hip surgical site reflected ecchymosis however unchanged over the last 24 hours, without superimposed appreciation of soft tissue/into commentary infectious process appreciated on examination.     Coloration:  No jaundice     Findings: No abnormal appearing skin rashes or lesions that appeared acute noted on unclothed area of the skin.. Chest wall dressing in place to cover cardiac device secondary to patient reported history of behavioral disturbance and pulling out IVs.  Neurological:      General: No focal sensory or motor deficits appreciated, no meningeal signs or dysmetria noted. ? Prior episodes of dysarthria.     Cranial Nerves: Cranial nerves II to XII appearing grossly  intact.Patient appeared to be having difficulty following a stream of thought.     Genitals:  Deferred  Psychiatric:         The patient appears to be displaying normal mood and affect at the time of evaluation.     Labs:           Lab Results   Component Value Date     GLUCOSE 165 (H) 12/10/2023     CALCIUM 7.7 (L) 12/10/2023      12/10/2023     K 4.3 12/10/2023     CO2 20 (L) 12/10/2023      12/10/2023     BUN 43 (H) 12/10/2023     CREATININE 1.86 (H) 12/10/2023            Lab Results   Component Value Date     WBC 15.1 (H) 12/10/2023     HGB 8.2 (L) 12/10/2023     HCT 26.8 (L) 12/10/2023     MCV 95 12/10/2023      12/10/2023      Lab Results   Component Value Date    WBC 14.1 (H) 12/11/2023    HGB 7.4 (L) 12/11/2023    HCT 23.0 (L) 12/11/2023    MCV 92 12/11/2023     12/11/2023      [unfilled]   [unfilled]   No results found for the last 90 days.                  X-rays/ Images     Procedure Component Value Units Date/Time   CT brain attack head wo IV contrast [[id number]] Resulted: 12/11/23 0849   Order Status: No result Updated: 12/11/23 0858   XR chest 1 view [[id number]] Collected: 12/10/23 0338   Order Status: Completed Updated: 12/10/23 0338   Narrative:     Interpreted By:  Finkelstein, Evan,  STUDY:  XR CHEST 1 VIEW;  12/10/2023 3:32 am      INDICATION:  Signs/Symptoms:check foriegn object from pulling out medport.      COMPARISON:  Chest radiograph 06/14/2019      ACCESSION NUMBER(S):  [id number]      ORDERING CLINICIAN:  MIKAELA WEBB      FINDINGS:  Redemonstrated right chest wall port, tip overlying the right atrium.  Redemonstrated left chest wall pacemaker.      CARDIOMEDIASTINAL SILHOUETTE:  Enlarged cardiac silhouette, similar compared to prior imaging.      LUNGS:  No pulmonary consolidation, pleural effusion or pneumothorax.      ABDOMEN:  No remarkable upper abdominal findings.      BONES:  No acute osseous abnormality. Surgical clips overlie the left  chest  wall.       Impression:     No radiographic evidence of acute cardiopulmonary pathology.      MACRO:  None.      Signed by: Evan Finkelstein 12/10/2023 3:37 AM  Dictation workstation:   [id number]   FL less than 1 hour [[id number]] Resulted: 12/08/23 1335   Order Status: Completed Updated: 12/08/23 1335   Narrative:     These images are not reportable by radiology and will not be interpreted  by  Radiologists.   CT lumbar spine wo IV contrast [[id number]] Collected: 12/07/23 1828   Order Status: Completed Updated: 12/07/23 1828   Narrative:     Interpreted By:  Charly Siddiqi,  STUDY:  CT LUMBAR SPINE WO IV CONTRAST  12/7/2023 6:01 pm      INDICATION:  Signs/Symptoms:fall, left forearm/wrist pain, lhip pain, lower back  pain      COMPARISON:  None.  December 2, 2022 CT abdomen and pelvis and same day left hip  radiographs.      ACCESSION NUMBER(S):  [id number]      ORDERING CLINICIAN:  AVA COPELAND      TECHNIQUE:  Axial CT images of the lumbar spine are obtained. Axial, coronal and  sagittal reconstructions are provided for review.      FINDINGS:  Alignment:  Stable mild rightward curvature lumbar spine. No evidence  significant subluxation.      Vertebra and intervertebral disc spaces: There is underlying osseous  demineralization limiting the accuracy of the assessment. Grossly  stable moderate grade L2 compressive deformity. No evident acute  lumbosacral spine fracture or suspicious osseous lesion. Left  proximal femoral fracture noted on same day radiographs not well  delineated.      Grossly stable osseous findings related to multilevel spondylosis and  degenerative disc changes.      Prevertebral/Paraspinal Soft Tissues: No detected acute hematoma.  Grossly stable pre-existing findings including extensive arterial  vascular calcifications, probable scar or atelectasis at the lung  bases, colonic diverticulosis and probably benign renal cysts.       Impression:     Left proximal femoral fractures  noted on same day radiographs not  well delineated.      No acute detected abnormality involving the lumbar spine.      MACRO:  None      Signed by: Charly Siddiqi 12/7/2023 6:26 PM  Dictation workstation:   [id number]   XR forearm left 2 views [[id number]] Collected: 12/07/23 1819   Order Status: Completed Updated: 12/07/23 1819   Narrative:     Interpreted By:  Charly Siddiqi,  STUDY:  XR WRIST LEFT 3+ VIEWS; XR FOREARM LEFT 2 VIEWS; ;  12/7/2023 5:43 pm      INDICATION:  Signs/Symptoms:fall, left forearm/wrist pain, lhip pain, lower back  pain.      COMPARISON:  March 25, 2014 wrist radiographs.      ACCESSION NUMBER(S):  [id number]; [id number]      ORDERING CLINICIAN:  AVA COPELAND      FINDINGS:  2 views of the left forearm and three views of the left wrist were  obtained. There is osseous demineralization which limits the accuracy  of the assessment.      No detected elbow region fracture or significant elbow effusion.      Grossly intact plate screw fixation hardware distal radius. There is  newly seen subtle buckle deformity involving the extra-articular  portion of the distal radius potentially indicating acute impaction  fracture versus chronic changes including findings related to  difference in patient position. Attention recommended on clinical  assessment. Stable seen positive ulnar variance measuring 4 mm. No  gross displaced acute fractures. Polyarticular arthrosis and  chondrocalcinosis. There are scattered arterial vascular  calcifications.       Impression:     Newly seen subtle difference in the appearance of the distal radius  potentially due to impaction fracture versus difference in patient  position. Attention recommended on clinical assessment. Depending on  concern CT correlation could be considered.      Additional findings as reported.              MACRO:  None      Signed by: Charly Siddiqi 12/7/2023 6:18 PM  Dictation workstation:   [id number]   XR wrist left 3+ views  [[id number]] Collected: 12/07/23 1819   Order Status: Completed Updated: 12/07/23 1819   Narrative:     Interpreted By:  Charly Siddiqi,  STUDY:  XR WRIST LEFT 3+ VIEWS; XR FOREARM LEFT 2 VIEWS; ;  12/7/2023 5:43 pm      INDICATION:  Signs/Symptoms:fall, left forearm/wrist pain, lhip pain, lower back  pain.      COMPARISON:  March 25, 2014 wrist radiographs.      ACCESSION NUMBER(S):  [id number]; [id number]      ORDERING CLINICIAN:  AVA COPELAND      FINDINGS:  2 views of the left forearm and three views of the left wrist were  obtained. There is osseous demineralization which limits the accuracy  of the assessment.      No detected elbow region fracture or significant elbow effusion.      Grossly intact plate screw fixation hardware distal radius. There is  newly seen subtle buckle deformity involving the extra-articular  portion of the distal radius potentially indicating acute impaction  fracture versus chronic changes including findings related to  difference in patient position. Attention recommended on clinical  assessment. Stable seen positive ulnar variance measuring 4 mm. No  gross displaced acute fractures. Polyarticular arthrosis and  chondrocalcinosis. There are scattered arterial vascular  calcifications.       Impression:     Newly seen subtle difference in the appearance of the distal radius  potentially due to impaction fracture versus difference in patient  position. Attention recommended on clinical assessment. Depending on  concern CT correlation could be considered.      Additional findings as reported.              MACRO:  None      Signed by: Charly Siddiqi 12/7/2023 6:18 PM  Dictation workstation:   [id number]   XR hip left with pelvis when performed 2 or 3 views [[id number]] Collected: 12/07/23 1834   Order Status: Completed Updated: 12/07/23 1834   Narrative:     Interpreted By:  Charly Siddiqi,  STUDY:  XR HIP LEFT WITH PELVIS WHEN PERFORMED 2 OR 3 VIEWS; ;  12/7/2023  5:43 pm       INDICATION:  Signs/Symptoms:fall, left forearm/wrist pain, lhip pain, lower back  pain.      COMPARISON:  December 2, 2022 CT scan abdomen and pelvis.      ACCESSION NUMBER(S):  [id number]      ORDERING CLINICIAN:  AVA COPELAND      FINDINGS:  Acute displaced and angulated left intertrochanteric fractures  including displacement of the trochanters. There are scattered  degenerative changes and arterial vascular calcifications. No  suspicious osseous lesion.       Impression:     Acute left angulated and displaced intertrochanteric fractures with  displacement of the trochanters. Depending on clinical concern CT  correlation could be considered.          MACRO:  None      Signed by: Charly Siddiqi 12/7/2023 6:32 PM  Dictation workstation:   [id number]                  Medical Problems         Problem List         * (Principal) Fall, initial encounter     Abdominal wall mass     Anxiety state     Overview Signed 3/16/2023  6:19 PM by Madeline Tyson       Note: AG           Atrial fibrillation (CMS/HCC)     Overview Signed 3/16/2023  6:19 PM by Madeline Tyson       Note: fe           Bilateral carotid artery stenosis     Cerumen impaction     CKD (chronic kidney disease) stage 3, GFR 30-59 ml/min (CMS/HCC)     Closed head injury     COVID-19 virus infection     Decreased range of motion of left shoulder     Depression, major, single episode, moderate (CMS/HCC)     Edema leg     HTN (hypertension)     Overview Signed 3/16/2023  6:19 PM by Madeline Tyson       Note: fe           Fall, accidental     Gastric ulcer     GI bleeding     History of breast cancer     History of lymphoma     History of thyroid cancer     Hyperlipidemia     Overview Signed 3/16/2023  6:19 PM by Madeline Tyson       Note: fe           Hypothyroidism     Overview Signed 3/16/2023  6:19 PM by Madeline Tyson       Note: fe           Peripheral arterial disease (CMS/HCC)     Peritonitis (CMS/HCC)     Pruritus     Skin cancer of nose      Traumatic closed displaced fracture of proximal end of humerus with routine healing     Left wrist fracture, closed, initial encounter     GERD (gastroesophageal reflux disease)     CAD (coronary artery disease)     Closed fracture of left hip (CMS/[facility])                  Above medical problems may be reflective of historical medical problems that may have resolved and may not related to acute clinical condition/medical problems.     Clinical impression/plan:        1. Closed fracture of left hip, initial encounter (CMS/[facility])  Case Request Operating Room: Hip Fracture open reduction internal fixation with nail     Case Request Operating Room: Hip Fracture open reduction internal fixation with nail     s/p ORIF, supportive care, may need SNF placement, need casemanagement       2. Fall, initial encounter          3. History of anticoagulant therapy        monitor INR/PTT       4. Primary hypertension        LOw BP due to dehydration, monmitor and adjust meds as needed.       5. Atrial fibrillation, unspecified type (CMS/[facility])        monitor INR and adjust coumadin as indicated       6. Hypothyroidism, unspecified type        continue synthroid       7. Cognitive disorder        need family memhber for supportive care.       8. ISAAC (acute kidney injury) (CMS/[facility])         9.  Perceive new speech deficit    10..Undifferentiated shock     Additional care plan/disposition: 12/11/2023     Concerning recent reported perceived change in speech will obtain stat CT scan of the head.  Stroke alert protocol.     Worsening acute kidney injury, avoid nephrotoxins, escalate IV fluid hydration.  Hold BP meds     Will discuss discharge disposition with family and case management.     CBC, BMP, check PT/INR, continue telemetry monitoring.     Will also check UA have to be mindful of urinary tract infection.Consider pancultures.     If worsening renal function will consult nephrology, continue to monitor off nephrotoxins, bladder scan.      Lung bases diminished will monitor closely throughout the day, if UA negative check chest radiograph have to be mindful of aspiration risk.     N.p.o.     Speech evaluation     Hypoglycemic protocol  Further recommendations forthcoming in accordance with patient's clinical disposition and response to care.      Additional care plan/disposition: 12/12/2023   Patient with undifferentiated shock, marked clinical decline, poor response to pressor th will erapy/vasopressor therapy, fluid resuscitation. .Patient be transition to hospice care, hospice consulted.  The patient's family including multiple daughters as well as patient's daughter is power of  wish to honor patient's wishes for no intubation and a DNR comfort care status.  Patient appears comfortable at this present time on comfort care measures.  Hospice service will be consulted.  Will continue comfort care measures.  Options of care discussed in great detail with patient's POA patient's POA elected for comfort care measures and consult to hospice.  Discharge planning:To be determined.     Care time: < 55 mins   Further comfort care recommendations forthcoming in accordance with patient's clinical disposition and response to care.       Dictation performed with assistance of voice recognition device therefore transcription errors are possible.

## 2023-12-12 NOTE — SIGNIFICANT EVENT
12/12/2023  0246    The patient who is comfort care only is in the intensive care unit.  There is a patient in the emergency room who needs an intensive care bed, and is intubated.  I was asked by the nurse to transfer the patient to [facility] due to her status.  Family was agreeable, and this transfer has been made.  Will continue comfort care measures on a [facility] bed.      Leon Herrmann, DO

## 2023-12-12 NOTE — CARE PLAN
Problem: Skin  Goal: Decreased wound size/increased tissue granulation at next dressing change  12/12/2023 1223 by Betsy Garcia RN  Outcome: Progressing  12/12/2023 1219 by Betsy Garcia RN  Outcome: Progressing  Flowsheets (Taken 12/12/2023 0421 by Elton Barreto, RN)  Decreased wound size/increased tissue granulation at next dressing change:   Promote sleep for wound healing   Protective dressings over bony prominences  Goal: Participates in plan/prevention/treatment measures  12/12/2023 1223 by Betsy Garcia RN  Outcome: Progressing  12/12/2023 1219 by Betsy Garcia RN  Outcome: Progressing  Flowsheets (Taken 12/12/2023 0421 by Elton Barreto, RN)  Participates in plan/prevention/treatment measures:   Discuss with provider PT/OT consult   Elevate heels  Goal: Prevent/manage excess moisture  12/12/2023 1223 by Betsy Garcia RN  Outcome: Progressing  12/12/2023 1219 by Betsy Garcia RN  Outcome: Progressing  Flowsheets (Taken 12/12/2023 0421 by Elton Barreto RN)  Prevent/manage excess moisture:   Cleanse incontinence/protect with barrier cream   Moisturize dry skin   Follow provider orders for dressing changes   Monitor for/manage infection if present  Goal: Prevent/minimize sheer/friction injuries  12/12/2023 1223 by Betsy Garcia RN  Outcome: Progressing  12/12/2023 1219 by Betsy Garcia RN  Outcome: Progressing  Flowsheets (Taken 12/12/2023 0421 by Elton Barreto, JAY)  Prevent/minimize sheer/friction injuries:   Complete micro-shifts as needed if patient unable. Adjust patient position to relieve pressure points, not a full turn   HOB 30 degrees or less   Increase activity/out of bed for meals   Turn/reposition every 2 hours/use positioning/transfer devices   Use pull sheet  Goal: Promote/optimize nutrition  12/12/2023 1223 by Betsy Garcia RN  Outcome: Progressing  12/12/2023 1219 by Betsy Garcia RN  Outcome: Progressing  Flowsheets (Taken 12/12/2023 0421 by Elton Barreto RN)  Promote/optimize  nutrition:   Assist with feeding   Consume > 50% meals/supplements   Monitor/record intake including meals   Offer water/supplements/favorite foods  Goal: Promote skin healing  12/12/2023 1223 by Betsy Garcia RN  Outcome: Progressing  12/12/2023 1219 by Betsy Garcia RN  Outcome: Progressing  Flowsheets (Taken 12/12/2023 0421 by Elton Barreto RN)  Promote skin healing:   Assess skin/pad under line(s)/device(s)   Ensure correct size (line/device) and apply per  instructions   Rotate device position/do not position patient on device   Turn/reposition every 2 hours/use positioning/transfer devices   Protective dressings over bony prominences   The patient's goals for the shift include      The clinical goals for the shift include patient to remain safe this shift

## 2023-12-12 NOTE — CARE PLAN
Problem: Skin  Goal: Decreased wound size/increased tissue granulation at next dressing change  12/12/2023 1225 by Betsy Garcia RN  Outcome: Progressing  12/12/2023 1223 by Betsy Garcia RN  Outcome: Progressing  12/12/2023 1219 by Betsy Garcia RN  Outcome: Progressing  Flowsheets (Taken 12/12/2023 0421 by Elton Barreto, RN)  Decreased wound size/increased tissue granulation at next dressing change:   Promote sleep for wound healing   Protective dressings over bony prominences  Goal: Participates in plan/prevention/treatment measures  12/12/2023 1225 by Betsy Garcia RN  Outcome: Progressing  12/12/2023 1223 by Betsy Garcia RN  Outcome: Progressing  12/12/2023 1219 by Betsy Garcia RN  Outcome: Progressing  Flowsheets (Taken 12/12/2023 0421 by Elton Barreto, RN)  Participates in plan/prevention/treatment measures:   Discuss with provider PT/OT consult   Elevate heels  Goal: Prevent/manage excess moisture  12/12/2023 1225 by Betsy Garcia RN  Outcome: Progressing  12/12/2023 1223 by Betsy Garcia RN  Outcome: Progressing  12/12/2023 1219 by Betsy Garcia RN  Outcome: Progressing  Flowsheets (Taken 12/12/2023 0421 by Elton Barreto, RN)  Prevent/manage excess moisture:   Cleanse incontinence/protect with barrier cream   Moisturize dry skin   Follow provider orders for dressing changes   Monitor for/manage infection if present  Goal: Prevent/minimize sheer/friction injuries  12/12/2023 1225 by Betsy Garcia RN  Outcome: Progressing  12/12/2023 1223 by Betsy Garcia RN  Outcome: Progressing  12/12/2023 1219 by Betsy Garcia RN  Outcome: Progressing  Flowsheets (Taken 12/12/2023 0421 by Elton Barreto RN)  Prevent/minimize sheer/friction injuries:   Complete micro-shifts as needed if patient unable. Adjust patient position to relieve pressure points, not a full turn   HOB 30 degrees or less   Increase activity/out of bed for meals   Turn/reposition every 2 hours/use positioning/transfer devices   Use pull  sheet  Goal: Promote/optimize nutrition  12/12/2023 1225 by Betsy Garcia RN  Outcome: Progressing  12/12/2023 1223 by Betsy Garcia RN  Outcome: Progressing  12/12/2023 1219 by Betsy Garcia RN  Outcome: Progressing  Flowsheets (Taken 12/12/2023 0421 by Elton Barreto, RN)  Promote/optimize nutrition:   Assist with feeding   Consume > 50% meals/supplements   Monitor/record intake including meals   Offer water/supplements/favorite foods  Goal: Promote skin healing  12/12/2023 1225 by Betsy Garcia RN  Outcome: Progressing  12/12/2023 1223 by Betsy Garcia RN  Outcome: Progressing  12/12/2023 1219 by Betsy Garcia RN  Outcome: Progressing  Flowsheets (Taken 12/12/2023 0421 by Elton Barreto RN)  Promote skin healing:   Assess skin/pad under line(s)/device(s)   Ensure correct size (line/device) and apply per  instructions   Rotate device position/do not position patient on device   Turn/reposition every 2 hours/use positioning/transfer devices   Protective dressings over bony prominences   The patient's goals for the shift include      The clinical goals for the shift include patient to remain safe this shift

## 2023-12-12 NOTE — CARE PLAN
The patient's goals for the shift include      The clinical goals for the shift include pt will remain adequate comfort this shift

## 2023-12-12 NOTE — PROGRESS NOTES
Met with pt and family to discuss hospice options. Discussed [facility] (with financial disclosure) and community hospice options. Family chose [facility], referral placed in [facility], notified provider, bedside nurse, and [facility].  Agency will contact family to arrange meeting and discuss services.   Social Work to follow for any continued needs.

## 2023-12-12 NOTE — PROGRESS NOTES
12/12/2023  0246    The patient who is comfort care only is in the intensive care unit.  I was asked by the nurse to transfer the patient to [facility] due to her status.  Family was agreeable, and this transfer has been made.      Leon Herrmann, DO

## 2023-12-12 NOTE — SIGNIFICANT EVENT
Patient CODE STATUS was discussed with patient's daughter patient's daughter reported that patient was never a full code should be a DNR do not wish for intubation.  Options of care concerning transfer to the ICU, intubation in light of patient's clinical disposition was discussed in detail with patient's daughter.  Will patient's daughter and brother was informed of patient critical condition clinical suspicion of poor physiological reserve and high risk for demise.  They were okay with a trial of medical therapy including pressor therapy and transfer to ICU.  Initially with a DNR/DNI CODE STATUS.       In the presence of nursing staff as well as : Change in CODE STATUS:Patient transition to comfort care only by her healthcare proxy.Patient appeared to have poor prognosis with poor response to clinical intervention and rapid clinical decline. Patient with high complexity characteristics and appeared to be at high risk for clinical decline/deterioration  and unpredictable clinical course.      Care plan discussed with patient's power of  her daughter Ms Anna Shine.Patient and her sibling have decided on comfort care measures only, discussed with Dr. Ribeiro ( Patient signed-out to our nocturnist team)    Given patient's clinical decline and overall  apparent poor prognosis she ( Reported POA /Ms Anna Shine) has elected to make patient's comfort care.  Initial plan to transfer to ICU with trial of pressor therapy, no intubation, DO NOT RESUSCITATE.  Patient did not respond well to pressor therapy (Levophed selected in light of reported Benadryl allergy) reported allergic reaction to Benadryl and Carlos-Synephrine did not appear to be in appropriate vasopressor in light of history of allergic reaction.    Comfort care measures will be implemented.  Patient and family was made aware that the patient will likely pass away soon.  Family currently at the bedside.  Consideration for palliative  care or hospice care.  However given rapid nature of patient decline and the fact that such consultation may not be able to be achieved tonight we will continue comfort care measures.

## 2023-12-12 NOTE — PROGRESS NOTES
Vancomycin Dosing by Pharmacy- Cessation of Therapy    Consult to pharmacy for vancomycin dosing has been discontinued by the prescriber, pharmacy will sign off at this time.    Please call pharmacy if there are further questions or re-enter a consult if vancomycin is resumed.     Herminio Lew [facility]

## 2023-12-12 NOTE — PROGRESS NOTES
Spiritual Care Visit    Clinical Encounter Type  Visited With: Patient and family together  Routine Visit: Introduction    Catholic Encounters  Catholic Needs: Prayer

## 2023-12-12 NOTE — NURSING NOTE
Pt transferred from ICU to room via 2301 w/ ICU RN and family. Bedside report completed. Vs's obtained and Bp noted to be 74/48 manually. Family asking if medication would be given and if blood work would be done this AM to look at pts labs. This RN wanted to clarify w/ family that pt is on comfort measures only and medication interventions and labs are not given w/ these pts. This RN reviewed what could be done via meds to help pt feel more comfortable and family verbally agreed they understood and wanted to continue comfort measures only

## 2023-12-13 ENCOUNTER — HOSPITAL ENCOUNTER (INPATIENT)
Facility: HOSPITAL | Age: 88
LOS: 1 days | DRG: 951 | End: 2023-12-13
Attending: HOSPITALIST | Admitting: HOSPITALIST
Payer: OTHER MISCELLANEOUS

## 2023-12-13 VITALS
HEART RATE: 88 BPM | TEMPERATURE: 99 F | RESPIRATION RATE: 13 BRPM | BODY MASS INDEX: 23.79 KG/M2 | SYSTOLIC BLOOD PRESSURE: 79 MMHG | HEIGHT: 63 IN | DIASTOLIC BLOOD PRESSURE: 57 MMHG | WEIGHT: 134.26 LBS | OXYGEN SATURATION: 95 %

## 2023-12-13 PROCEDURE — 2500000004 HC RX 250 GENERAL PHARMACY W/ HCPCS (ALT 636 FOR OP/ED): Performed by: INTERNAL MEDICINE

## 2023-12-13 PROCEDURE — 99239 HOSP IP/OBS DSCHRG MGMT >30: CPT | Performed by: HOSPITALIST

## 2023-12-13 PROCEDURE — 1150000001 HC HOSPICE PRIVATE ROOM DAILY

## 2023-12-13 RX ORDER — HALOPERIDOL 5 MG/ML
1 INJECTION INTRAMUSCULAR EVERY 4 HOURS PRN
Status: CANCELLED | OUTPATIENT
Start: 2023-12-13

## 2023-12-13 RX ORDER — HYOSCYAMINE SULFATE 0.12 MG/1
0.12 TABLET, ORALLY DISINTEGRATING ORAL EVERY 4 HOURS PRN
Status: CANCELLED | OUTPATIENT
Start: 2023-12-13

## 2023-12-13 RX ORDER — LORAZEPAM 2 MG/ML
0.5 INJECTION INTRAMUSCULAR EVERY 4 HOURS PRN
Status: CANCELLED | OUTPATIENT
Start: 2023-12-13

## 2023-12-13 RX ORDER — KETOROLAC TROMETHAMINE 30 MG/ML
15 INJECTION, SOLUTION INTRAMUSCULAR; INTRAVENOUS EVERY 6 HOURS PRN
Status: CANCELLED | OUTPATIENT
Start: 2023-12-13 | End: 2023-12-18

## 2023-12-13 RX ADMIN — HYDROMORPHONE HYDROCHLORIDE 0.4 MG: 1 INJECTION, SOLUTION INTRAMUSCULAR; INTRAVENOUS; SUBCUTANEOUS at 11:07

## 2023-12-13 RX ADMIN — HYDROMORPHONE HYDROCHLORIDE 0.4 MG: 1 INJECTION, SOLUTION INTRAMUSCULAR; INTRAVENOUS; SUBCUTANEOUS at 03:01

## 2023-12-13 RX ADMIN — LORAZEPAM 0.5 MG: 2 INJECTION INTRAMUSCULAR; INTRAVENOUS at 11:07

## 2023-12-13 RX ADMIN — LORAZEPAM 0.5 MG: 2 INJECTION INTRAMUSCULAR; INTRAVENOUS at 03:01

## 2023-12-13 ASSESSMENT — COGNITIVE AND FUNCTIONAL STATUS - GENERAL
CLIMB 3 TO 5 STEPS WITH RAILING: TOTAL
TURNING FROM BACK TO SIDE WHILE IN FLAT BAD: TOTAL
MOVING TO AND FROM BED TO CHAIR: TOTAL
WALKING IN HOSPITAL ROOM: TOTAL
HELP NEEDED FOR BATHING: TOTAL
DRESSING REGULAR LOWER BODY CLOTHING: TOTAL
STANDING UP FROM CHAIR USING ARMS: TOTAL
EATING MEALS: TOTAL
MOVING FROM LYING ON BACK TO SITTING ON SIDE OF FLAT BED WITH BEDRAILS: TOTAL
DAILY ACTIVITIY SCORE: 6
TOILETING: TOTAL
DRESSING REGULAR UPPER BODY CLOTHING: TOTAL
PERSONAL GROOMING: TOTAL
MOBILITY SCORE: 6

## 2023-12-13 NOTE — CARE PLAN
The patient's goals for the shift include      The clinical goals for the shift include patient to remain safe this shift    Over the shift, the patient did not make progress toward the following goals. Barriers to progression include pt. unresponsive. Recommendations to address these barriers include including family on education and teaching.

## 2023-12-13 NOTE — PROGRESS NOTES
Lyn Turner [id number]   Service: Internal Medicine / Hospitalist Date of service: 12/13/23                                  Full Code                           Subjective  Lyn Turner is a 89 y.o.  female with a past medical history significant for HTN, HLD, CAD, atrial fibrillation on Coumadin, HFpEF, s/p PPM, breast cancer s/p chemo/radiation/left mastectomy, thyroid cancer s/p thyroidectomy, hypothyroidism, anxiety, depression and GERD who presented 12/7/23 after a mechanical fall.  She states that she was walking from her kitchen into her living room where there is a small step and after she stepped down she was trying to talk to her family member who was behind her and when she turned she fell back landing on her left forearm as well as left hip.  She did not hit her head or lose consciousness.  She states that the step is maybe a 4 inch or less step between the 2 areas. Patient states that she has had prior wrist surgeries and about 2 to 3 years ago had actually broken her wrist on the left side.  Patient states that she has not taken any Coumadin today. Urinalysis was grossly unremarkable for any infectious etiologies. CT lumbar noted a left proximal femoral fracture, no acute abnormalities, degenerative changes.  XR left hip: Acute left angulated/displaced intertrochanteric fracture with displacement of the trochanters.  XR left wrist/forearm: Noted nearly seen subtle difference in appearance of the distal radius potentially due to impaction fracture versus difference in positioning. The ED physician did discuss case with the on-call orthopedic surgeon who had stated the forearm had a likely buckle fracture with intact hardware from prior surgical intervention and that they planned on taking the patient to the OR for surgical repair of the hip.     12/08/23: No acute events overnight. Vitals stable. Labs largely unchanged. INR 2.1. Hgb 10.1 (11.1). Going to OR for L IMN today     12/9/2023:  No acute events overnight. S/p left hip ORIF on 12/8/2023 pod#1. Patient tolerated procedure well and no acute complains of chest pain/shortness of breath/nausea/vomiting. Patient had cognitive abnormalities that she was not aware of the surgery and demanded to go home right away. I ordered ativan for possible agitation. Patient need close family member to comfort.      12/10/2023: Nurse staff reported patient try to climb out of bed. S/p left hip ORIF on 12/8/2023 pod#2. Patient had low BP and increased BUN/creatinine. I give one bolus of 250 ml NS and increase NS 75 ml/hr. Patient had pain of left hip when exertion. Pain improved when rest at bed. Pain meds on board. She tolerated diet well.              Patient seen and examined, lab data and diagnostics reviewed.     12/11.............Alert called by patient's nurse earlier this morning and of perceived change in speech.  The patient's nurse reported that over the weekend patient speech appeared much clear.  Patient was seen and examined by the hospitalist service and on-call a.m. physician.  I also evaluated patient at the bedside.  In light of I perceived change in speech observed by patient's nurse recommend obtaining CT scan of the head.       12/12.....................Patient seen and examined in presence of her daughters.  Patient tranquil.  Comfortable currently DNR comfort care measures only.  Patient family including patient's power of  her daughter wish to continue with comfort care orders of.  Hospice discussed family wish for transition to hospice care, pastoral care service also requested by family.  The patient daughter at the bedside reports that she is also in the medical field she felt that her mother is comfortable with current comfort care measures.  She did report however that she does that her mother had episodes of increasing secretions earlier today, Caro was added.    12/13......................Patient seen family at the  bedside, no complaints reported, patient resting peacefully responding well to supportive care.     Review of Systems:   Review of system otherwise negative if not aforementioned above in subjective.        Physical Exam      Constitutional:       Appearance: Patient appeared in no acute cardiopulmonary distress.     Comments: Patient's somnolent but appeared comfortable.  HEENT:      Head: Normocephalic and atraumatic.Trachea midline      Nose:No observed congestion or rhinorrhea. Nasal cannula in place.    Mouth/Throat: Mucous membranes dry, Trachea appeared  midline,tongue dry  Eyes:      Extraocular Movements: Extraocular movements intact.      Pupils: Pupils are equal, round, and reactive to light.      Comments: No scleral icterus or conjunctival injection appreciated.   Cardiovascular:      Rate and Rhythm: Normal rate and regular rhythm. No clicks rubs or gallops, normal S1 and S2.No peripheral stigmata of endocarditis appreciated.     Pulmonary:      Faint crackles bilaterally.  Abdominal:      General: Abdomen soft, nontender, active bowel sounds, no involuntary guarding or rebound tenderness appreciated.     Comments: None   Musculoskeletal:       Patient appeared to have full active range of motion for upper and extremities.Left hip fractureNo pitting edema or cyanosis appreciated.       Lymphadenopathy:      No appreciable palpable lymphadenopathy  Skin:     General: Skin is warm. Left hip surgical site reflected ecchymosis however unchanged over the last 24 hours, without superimposed appreciation of soft tissue/into commentary infectious process appreciated on examination.     Coloration:  No jaundice     Findings: No abnormal appearing skin rashes or lesions that appeared acute noted on unclothed area of the skin.. Chest wall dressing in place to cover cardiac device secondary to patient reported history of behavioral disturbance and pulling out IVs.  Neurological:      General: No focal sensory or  motor deficits appreciated, no meningeal signs or dysmetria noted. ? Prior episodes of dysarthria.     Cranial Nerves: Cranial nerves II to XII appearing grossly intact.Patient appeared to be having difficulty following a stream of thought.     Genitals:  Deferred  Psychiatric:         The patient appears to be displaying normal mood and affect at the time of evaluation.     Labs:               Lab Results   Component Value Date     GLUCOSE 165 (H) 12/10/2023     CALCIUM 7.7 (L) 12/10/2023      12/10/2023     K 4.3 12/10/2023     CO2 20 (L) 12/10/2023      12/10/2023     BUN 43 (H) 12/10/2023     CREATININE 1.86 (H) 12/10/2023                Lab Results   Component Value Date     WBC 15.1 (H) 12/10/2023     HGB 8.2 (L) 12/10/2023     HCT 26.8 (L) 12/10/2023     MCV 95 12/10/2023      12/10/2023            Lab Results   Component Value Date     WBC 14.1 (H) 12/11/2023     HGB 7.4 (L) 12/11/2023     HCT 23.0 (L) 12/11/2023     MCV 92 12/11/2023      12/11/2023      [unfilled]   [unfilled]   No results found for the last 90 days.                  X-rays/ Images     Procedure Component Value Units Date/Time   CT brain attack head wo IV contrast [[id number]] Resulted: 12/11/23 0849   Order Status: No result Updated: 12/11/23 0858   XR chest 1 view [[id number]] Collected: 12/10/23 0338   Order Status: Completed Updated: 12/10/23 0338   Narrative:     Interpreted By:  Finkelstein, Evan,  STUDY:  XR CHEST 1 VIEW;  12/10/2023 3:32 am      INDICATION:  Signs/Symptoms:check foriegn object from pulling out medport.      COMPARISON:  Chest radiograph 06/14/2019      ACCESSION NUMBER(S):  [id number]      ORDERING CLINICIAN:  MIKAELA WEBB      FINDINGS:  Redemonstrated right chest wall port, tip overlying the right atrium.  Redemonstrated left chest wall pacemaker.      CARDIOMEDIASTINAL SILHOUETTE:  Enlarged cardiac silhouette, similar compared to prior imaging.      LUNGS:  No pulmonary  consolidation, pleural effusion or pneumothorax.      ABDOMEN:  No remarkable upper abdominal findings.      BONES:  No acute osseous abnormality. Surgical clips overlie the left chest  wall.       Impression:     No radiographic evidence of acute cardiopulmonary pathology.      MACRO:  None.      Signed by: Evan Finkelstein 12/10/2023 3:37 AM  Dictation workstation:   [id number]   FL less than 1 hour [[id number]] Resulted: 12/08/23 1335   Order Status: Completed Updated: 12/08/23 1335   Narrative:     These images are not reportable by radiology and will not be interpreted  by  Radiologists.   CT lumbar spine wo IV contrast [[id number]] Collected: 12/07/23 1828   Order Status: Completed Updated: 12/07/23 1828   Narrative:     Interpreted By:  Charly Siddiqi,  STUDY:  CT LUMBAR SPINE WO IV CONTRAST  12/7/2023 6:01 pm      INDICATION:  Signs/Symptoms:fall, left forearm/wrist pain, lhip pain, lower back  pain      COMPARISON:  None.  December 2, 2022 CT abdomen and pelvis and same day left hip  radiographs.      ACCESSION NUMBER(S):  [id number]      ORDERING CLINICIAN:  AVA COPELAND      TECHNIQUE:  Axial CT images of the lumbar spine are obtained. Axial, coronal and  sagittal reconstructions are provided for review.      FINDINGS:  Alignment:  Stable mild rightward curvature lumbar spine. No evidence  significant subluxation.      Vertebra and intervertebral disc spaces: There is underlying osseous  demineralization limiting the accuracy of the assessment. Grossly  stable moderate grade L2 compressive deformity. No evident acute  lumbosacral spine fracture or suspicious osseous lesion. Left  proximal femoral fracture noted on same day radiographs not well  delineated.      Grossly stable osseous findings related to multilevel spondylosis and  degenerative disc changes.      Prevertebral/Paraspinal Soft Tissues: No detected acute hematoma.  Grossly stable pre-existing findings including extensive  arterial  vascular calcifications, probable scar or atelectasis at the lung  bases, colonic diverticulosis and probably benign renal cysts.       Impression:     Left proximal femoral fractures noted on same day radiographs not  well delineated.      No acute detected abnormality involving the lumbar spine.      MACRO:  None      Signed by: Charly Siddiqi 12/7/2023 6:26 PM  Dictation workstation:   [id number]   XR forearm left 2 views [[id number]] Collected: 12/07/23 1819   Order Status: Completed Updated: 12/07/23 1819   Narrative:     Interpreted By:  Charly Siddiqi,  STUDY:  XR WRIST LEFT 3+ VIEWS; XR FOREARM LEFT 2 VIEWS; ;  12/7/2023 5:43 pm      INDICATION:  Signs/Symptoms:fall, left forearm/wrist pain, lhip pain, lower back  pain.      COMPARISON:  March 25, 2014 wrist radiographs.      ACCESSION NUMBER(S):  [id number]; [id number]      ORDERING CLINICIAN:  AVA COPELAND      FINDINGS:  2 views of the left forearm and three views of the left wrist were  obtained. There is osseous demineralization which limits the accuracy  of the assessment.      No detected elbow region fracture or significant elbow effusion.      Grossly intact plate screw fixation hardware distal radius. There is  newly seen subtle buckle deformity involving the extra-articular  portion of the distal radius potentially indicating acute impaction  fracture versus chronic changes including findings related to  difference in patient position. Attention recommended on clinical  assessment. Stable seen positive ulnar variance measuring 4 mm. No  gross displaced acute fractures. Polyarticular arthrosis and  chondrocalcinosis. There are scattered arterial vascular  calcifications.       Impression:     Newly seen subtle difference in the appearance of the distal radius  potentially due to impaction fracture versus difference in patient  position. Attention recommended on clinical assessment. Depending on  concern CT correlation could be  considered.      Additional findings as reported.              MACRO:  None      Signed by: Charly Siddiqi 12/7/2023 6:18 PM  Dictation workstation:   [id number]   XR wrist left 3+ views [[id number]] Collected: 12/07/23 1819   Order Status: Completed Updated: 12/07/23 1819   Narrative:     Interpreted By:  Charly Siddiqi,  STUDY:  XR WRIST LEFT 3+ VIEWS; XR FOREARM LEFT 2 VIEWS; ;  12/7/2023 5:43 pm      INDICATION:  Signs/Symptoms:fall, left forearm/wrist pain, lhip pain, lower back  pain.      COMPARISON:  March 25, 2014 wrist radiographs.      ACCESSION NUMBER(S):  [id number]; [id number]      ORDERING CLINICIAN:  AVA COPELAND      FINDINGS:  2 views of the left forearm and three views of the left wrist were  obtained. There is osseous demineralization which limits the accuracy  of the assessment.      No detected elbow region fracture or significant elbow effusion.      Grossly intact plate screw fixation hardware distal radius. There is  newly seen subtle buckle deformity involving the extra-articular  portion of the distal radius potentially indicating acute impaction  fracture versus chronic changes including findings related to  difference in patient position. Attention recommended on clinical  assessment. Stable seen positive ulnar variance measuring 4 mm. No  gross displaced acute fractures. Polyarticular arthrosis and  chondrocalcinosis. There are scattered arterial vascular  calcifications.       Impression:     Newly seen subtle difference in the appearance of the distal radius  potentially due to impaction fracture versus difference in patient  position. Attention recommended on clinical assessment. Depending on  concern CT correlation could be considered.      Additional findings as reported.              MACRO:  None      Signed by: Charly Siddiqi 12/7/2023 6:18 PM  Dictation workstation:   [id number]   XR hip left with pelvis when performed 2 or 3 views [[id number]] Collected: 12/07/23 0890    Order Status: Completed Updated: 12/07/23 1834   Narrative:     Interpreted By:  Charly Siddiqi,  STUDY:  XR HIP LEFT WITH PELVIS WHEN PERFORMED 2 OR 3 VIEWS; ;  12/7/2023  5:43 pm      INDICATION:  Signs/Symptoms:fall, left forearm/wrist pain, lhip pain, lower back  pain.      COMPARISON:  December 2, 2022 CT scan abdomen and pelvis.      ACCESSION NUMBER(S):  [id number]      ORDERING CLINICIAN:  AVA COPELAND      FINDINGS:  Acute displaced and angulated left intertrochanteric fractures  including displacement of the trochanters. There are scattered  degenerative changes and arterial vascular calcifications. No  suspicious osseous lesion.       Impression:     Acute left angulated and displaced intertrochanteric fractures with  displacement of the trochanters. Depending on clinical concern CT  correlation could be considered.          MACRO:  None      Signed by: Charly Siddiqi 12/7/2023 6:32 PM  Dictation workstation:   [id number]                  Medical Problems         Problem List         * (Principal) Fall, initial encounter     Abdominal wall mass     Anxiety state     Overview Signed 3/16/2023  6:19 PM by Madeline Tyson       Note: AG           Atrial fibrillation (CMS/HCC)     Overview Signed 3/16/2023  6:19 PM by Madeline Tyson       Note: fe           Bilateral carotid artery stenosis     Cerumen impaction     CKD (chronic kidney disease) stage 3, GFR 30-59 ml/min (CMS/HCC)     Closed head injury     COVID-19 virus infection     Decreased range of motion of left shoulder     Depression, major, single episode, moderate (CMS/HCC)     Edema leg     HTN (hypertension)     Overview Signed 3/16/2023  6:19 PM by Madeline Tyson       Note: fe           Fall, accidental     Gastric ulcer     GI bleeding     History of breast cancer     History of lymphoma     History of thyroid cancer     Hyperlipidemia     Overview Signed 3/16/2023  6:19 PM by Madeline Tyson       Note: fe           Hypothyroidism      Overview Signed 3/16/2023  6:19 PM by Madeline Tyson       Note: fe           Peripheral arterial disease (CMS/[facility])     Peritonitis (CMS/[facility])     Pruritus     Skin cancer of nose     Traumatic closed displaced fracture of proximal end of humerus with routine healing     Left wrist fracture, closed, initial encounter     GERD (gastroesophageal reflux disease)     CAD (coronary artery disease)     Closed fracture of left hip (CMS/[facility])                  Above medical problems may be reflective of historical medical problems that may have resolved and may not related to acute clinical condition/medical problems.     Clinical impression/plan:        1. Closed fracture of left hip, initial encounter (CMS/[facility])  Case Request Operating Room: Hip Fracture open reduction internal fixation with nail     Case Request Operating Room: Hip Fracture open reduction internal fixation with nail     s/p ORIF, supportive care, may need SNF placement, need casemanagement       2. Fall, initial encounter          3. History of anticoagulant therapy        monitor INR/PTT       4. Primary hypertension        LOw BP due to dehydration, monmitor and adjust meds as needed.       5. Atrial fibrillation, unspecified type (CMS/[facility])        monitor INR and adjust coumadin as indicated       6. Hypothyroidism, unspecified type        continue synthroid       7. Cognitive disorder        need family memhber for supportive care.       8. ISAAC (acute kidney injury) (CMS/[facility])         9.  Perceive new speech deficit     10..Undifferentiated shock     Additional care plan/disposition: 12/11/2023     Concerning recent reported perceived change in speech will obtain stat CT scan of the head.  Stroke alert protocol.     Worsening acute kidney injury, avoid nephrotoxins, escalate IV fluid hydration.  Hold BP meds     Will discuss discharge disposition with family and case management.     CBC, BMP, check PT/INR, continue telemetry monitoring.     Will also  check UA have to be mindful of urinary tract infection.Consider pancultures.     If worsening renal function will consult nephrology, continue to monitor off nephrotoxins, bladder scan.     Lung bases diminished will monitor closely throughout the day, if UA negative check chest radiograph have to be mindful of aspiration risk.     N.p.o.     Speech evaluation     Hypoglycemic protocol  Further recommendations forthcoming in accordance with patient's clinical disposition and response to care.      Additional care plan/disposition: 12/12/2023   Patient with undifferentiated shock, marked clinical decline, poor response to pressor th will erapy/vasopressor therapy, fluid resuscitation. .Patient be transition to hospice care, hospice consulted.  The patient's family including multiple daughters as well as patient's daughter is power of  wish to honor patient's wishes for no intubation and a DNR comfort care status.  Patient appears comfortable at this present time on comfort care measures.  Hospice service will be consulted.  Will continue comfort care measures.  Options of care discussed in great detail with patient's POA patient's POA elected for comfort care measures and consult to hospice.  Discharge planning:To be determined.     12/13................Disposition/ plan    Patient will be transition to inpatient hospice today hospice meeting planned for this am.     Further comfort care recommendations forthcoming in accordance with patient's clinical disposition and response to care.        Dictation performed with assistance of voice recognition device therefore transcription errors are possible.

## 2023-12-13 NOTE — SIGNIFICANT EVENT
Lyn Turner  Physical examination performed.  Date of Evaluation: 12/13/2023    Physical examination performed and reflected no clinical signs of life.  No clinical signs of life found on examination including but not limited to : no peripheral pulses palpated , no corneal reflex , no response to nauseous or verbal  stimuli, no heart tones or spontaneous respiration appreciated.          Condolences:Condolences to the family of patient Lyn Yue       Time of Death: 9: 58 am on 12/13/2023

## 2023-12-13 NOTE — PROGRESS NOTES
"      Nephrology Progress Note      Nephrology following for ISAAC Patient has history of CKD with baseline creatinine 1.32-1.5. .   Events over night:   Doing well s/p ORIIF    BP 79/57 (BP Location: Right arm, Patient Position: Lying)   Pulse 88   Temp 37.2 °C (99 °F) (Temporal)   Resp 13   Ht 1.6 m (5' 3\")   Wt 60.9 kg (134 lb 4.2 oz)   SpO2 95%   BMI 23.78 kg/m²     Input / Output:  24 HR:   Intake/Output Summary (Last 24 hours) at 12/13/2023 1127  Last data filed at 12/13/2023 0700  Gross per 24 hour   Intake 0 ml   Output --   Net 0 ml       Physical Exam   Alert and oriented x 3 NAD  Neck: no JVD  CV: RRR  Lungs: CTA bilaterally  Abd: soft, NT, ND   Ext: 1+ lower extremity edema    Scheduled medications     Continuous medications     PRN medications  PRN medications: glycopyrrolate, haloperidol lactate, HYDROmorphone, HYDROmorphone, LORazepam, LORazepam, oxygen   Results from last 7 days   Lab Units 12/11/23  0945 12/08/23  0521 12/07/23  1654   SODIUM mmol/L 139   < > 138   POTASSIUM mmol/L 4.8   < > 4.2   CHLORIDE mmol/L 106   < > 104   CO2 mmol/L 19*   < > 27   BUN mg/dL 74*   < > 24*   CREATININE mg/dL 2.38*   < > 1.35*   CALCIUM mg/dL 7.1*   < > 8.8   PROTEIN TOTAL g/dL  --   --  6.1*   BILIRUBIN TOTAL mg/dL  --   --  0.8   ALK PHOS U/L  --   --  55   ALT U/L  --   --  8   AST U/L  --   --  14   GLUCOSE mg/dL 138*   < > 133*    < > = values in this interval not displayed.      Results from last 7 days   Lab Units 12/11/23  1046 12/09/23  0425 12/08/23  0521   MAGNESIUM mg/dL 2.01 1.68 1.70      Results from last 7 days   Lab Units 12/11/23  1801 12/11/23  0945 12/10/23  0549 12/09/23  0425   WBC AUTO x10*3/uL  --  14.1* 15.1* 10.3   HEMOGLOBIN g/dL 7.4* 7.8* 8.2* 8.4*   HEMATOCRIT % 23.0* 24.2* 26.8* 26.5*   PLATELETS AUTO x10*3/uL  --  171 177 130*        Assessment & Plan:          Expand All Collapse All                                                                                               "                                        Nephrology Consult Note                                                                                                                                          Inpatient consult to Nephrology  Consult performed by: Emma Leija DO  Consult ordered by: Maikol June DO                                                                                                                 HPI  Patient is a 89 y.o. female heart failure with preserved ejection fraction, breast cancer who is admitted to hospital  12/7 with complaints of  fall s/p Left intertrochanteric fracture. Nephrology consulted in view of ISAAC.  With history of hypertension, hyperlipidemia, CAD, A-fib on Coumadin.  Patient has history of CKD with baseline creatinine 1.32-1.5.  She went to the OR for ORIF and creatinine has increased from 1.4 preop to 2.4 today.  Patient is not able to give me history due to altered mental status.  This was an acute issue that came up today.  On admission she was alert and oriented and speaking full sentences and reading books.  She has external Villatoro catheter.  A stroke alert was called this morning due to her altered mental status.  She was having difficulty drinking from a straw.  She was able to tell me her name and told me she was 89 years old.  She was a bit difficult to understand.  BP has been low today.  Hemoglobin has dropped from 11 preop to use 7.5.     Risk factors for ISAAC  Hypotension yes  Contrast no  At risk medications valsartan     Medical History        Past Medical History:   Diagnosis Date    Chronic rhinitis       Rhinitis    Personal history of other diseases of the circulatory system       History of cardiac arrhythmia    Personal history of other diseases of the circulatory system       History of peripheral vascular disease    Personal history of other diseases of the nervous system and sense organs       History of cataract    Personal history of  other endocrine, nutritional and metabolic disease       History of type 2 diabetes mellitus    Personal history of other specified conditions       History of urinary frequency    Unspecified osteoarthritis, unspecified site       Osteoarthritis         Social History               Socioeconomic History    Marital status:        Spouse name: Not on file    Number of children: Not on file    Years of education: Not on file    Highest education level: Not on file   Occupational History    Not on file   Tobacco Use    Smoking status: Never    Smokeless tobacco: Never   Substance and Sexual Activity    Alcohol use: Never    Drug use: Never    Sexual activity: Not on file   Other Topics Concern    Not on file   Social History Narrative    Not on file      Social Determinants of Health           Financial Resource Strain: Low Risk  (12/11/2023)     Overall Financial Resource Strain (CARDIA)      Difficulty of Paying Living Expenses: Not hard at all   Food Insecurity: Not on file   Transportation Needs: No Transportation Needs (12/11/2023)     PRAPARE - Transportation      Lack of Transportation (Medical): No      Lack of Transportation (Non-Medical): No   Physical Activity: Not on file   Stress: Not on file   Social Connections: Not on file   Intimate Partner Violence: Not on file   Housing Stability: Low Risk  (12/11/2023)     Housing Stability Vital Sign      Unable to Pay for Housing in the Last Year: No      Number of Places Lived in the Last Year: 1      Unstable Housing in the Last Year: No         Family History          Family History   Problem Relation Name Age of Onset    Diabetes Father        Lung disease Father        Diabetes Paternal Grandmother        Diabetes Paternal Grandfather             No current facility-administered medications on file prior to encounter.             Current Outpatient Medications on File Prior to Encounter   Medication Sig Dispense Refill    warfarin (Coumadin) 2 mg  tablet Take by mouth.        acetaminophen (Tylenol) 500 mg tablet Take by mouth.        amitriptyline (Elavil) 10 mg tablet Take 1 tablet (10 mg) by mouth once daily at bedtime.        anastrozole (Arimidex) 1 mg tablet Take by mouth.        coenzyme Q-10 300 mg capsule capsule once daily.        cyanocobalamin, vitamin B-12, (Vitamin B-12) 1,000 mcg tablet extended release Take by mouth.        levothyroxine (Synthroid, Levoxyl) 100 mcg tablet Take by mouth.        metoprolol tartrate (Lopressor) 50 mg tablet Take 1 tablet (50 mg) by mouth in the morning and 1 tablet (50 mg) before bedtime.        mirtazapine (Remeron) 15 mg tablet Take 1 tablet (15 mg) by mouth once daily at bedtime.        ondansetron ODT (Zofran-ODT) 4 mg disintegrating tablet          pantoprazole (ProtoNix) 40 mg EC tablet Take 1 tablet (40 mg) by mouth once daily.        potassium chloride CR (K-Tab) 20 mEq ER tablet Take 1 tablet (20 mEq) by mouth in the morning and 1 tablet (20 mEq) before bedtime.        telmisartan (MIcarDIS) 80 mg tablet Take 1 tablet (80 mg) by mouth once daily.        warfarin (Coumadin) 4 mg tablet Take by mouth.          Scheduled medications  amitriptyline, 10 mg, oral, Nightly  cyanocobalamin, 500 mcg, oral, Daily  heparin flush, 50 Units, intra-catheter, q12h  levothyroxine, 100 mcg, oral, Daily  metoprolol, 2.5 mg, intravenous, q6h  metoprolol tartrate, 50 mg, oral, BID  mirtazapine, 15 mg, oral, Nightly  pantoprazole, 40 mg, oral, Daily  [Held by provider] valsartan, 160 mg, oral, Daily  [Held by provider] warfarin, 2 mg, oral, Once per day on Mon Wed Fri Sat  [Held by provider] warfarin, 4 mg, oral, Once per day on Sun Tue Thu        Continuous medications  oxygen, 2 L/min, Last Rate: 0 L/min (12/08/23 1821)  sodium chloride 0.9%, 100 mL/hr, Last Rate: 100 mL/hr (12/11/23 1215)        PRN medications  PRN medications: acetaminophen, alteplase, dextrose 10 % in water (D10W), dextrose, glucagon, heparin flush,  "heparin lock flush (porcine), LORazepam, morphine, morphine, ondansetron      Review of systems unable due to altered mental state   /76 (BP Location: Right arm, Patient Position: Lying)   Pulse 107 Comment: RN Reddy notified about HR of 107  Temp 36.8 °C (98.2 °F) (Temporal)   Resp 18   Ht 1.6 m (5' 3\")   Wt 60.9 kg (134 lb 4.2 oz)   SpO2 95%   BMI 23.78 kg/m²      Input / Output:  24 HR:   Intake/Output Summary (Last 24 hours) at 12/11/2023 1306  Last data filed at 12/11/2023 1215      Gross per 24 hour   Intake 2110.33 ml   Output --   Net 2110.33 ml         Physical Exam   Oriented x 1, lethargic  EOMI  OP clear, mucous membranes are dry  Neck: supple, No JVD  CV: RRR without m/r/g  Lungs: CTA bilaterally diminished  Abd: soft NT/ND +BS  : fullness in pelvis  Ext: no lower extremity edema   Neuro: Moves all extremities  Skin: no rashes            Results from last 7 days   Lab Units 12/11/23  0945 12/10/23  0549 12/09/23  0425   SODIUM mmol/L 139 136 139   POTASSIUM mmol/L 4.8 4.3 4.2   CHLORIDE mmol/L 106 102 105   CO2 mmol/L 19* 20* 26   BUN mg/dL 74* 43* 27*   CREATININE mg/dL 2.38* 1.86* 1.33*   GLUCOSE mg/dL 138* 165* 157*   CALCIUM mg/dL 7.1* 7.7* 7.9*                Results from last 7 days   Lab Units 12/11/23  0945 12/08/23  0521 12/07/23  1654   SODIUM mmol/L 139   < > 138   POTASSIUM mmol/L 4.8   < > 4.2   CHLORIDE mmol/L 106   < > 104   CO2 mmol/L 19*   < > 27   BUN mg/dL 74*   < > 24*   CREATININE mg/dL 2.38*   < > 1.35*   CALCIUM mg/dL 7.1*   < > 8.8   PROTEIN TOTAL g/dL  --   --  6.1*   BILIRUBIN TOTAL mg/dL  --   --  0.8   ALK PHOS U/L  --   --  55   ALT U/L  --   --  8   AST U/L  --   --  14   GLUCOSE mg/dL 138*   < > 133*    < > = values in this interval not displayed.             Results from last 7 days   Lab Units 12/11/23  1046 12/09/23  0425 12/08/23  0521   MAGNESIUM mg/dL 2.01 1.68 1.70             Results from last 7 days   Lab Units 12/11/23  0945 12/10/23  0549 " 12/09/23  0425   WBC AUTO x10*3/uL 14.1* 15.1* 10.3   HEMOGLOBIN g/dL 7.8* 8.2* 8.4*   HEMATOCRIT % 24.2* 26.8* 26.5*   PLATELETS AUTO x10*3/uL 171 177 130*         FL less than 1 hour   Final Result       CT lumbar spine wo IV contrast   Final Result   Left proximal femoral fractures noted on same day radiographs not   well delineated.        No acute detected abnormality involving the lumbar spine.        MACRO:   None        Signed by: Charly Siddiqi 12/7/2023 6:26 PM   Dictation workstation:   [id number]       XR forearm left 2 views   Final Result   Newly seen subtle difference in the appearance of the distal radius   potentially due to impaction fracture versus difference in patient   position. Attention recommended on clinical assessment. Depending on   concern CT correlation could be considered.        Additional findings as reported.                  MACRO:   None        Signed by: Charly Siddiqi 12/7/2023 6:18 PM   Dictation workstation:   [id number]       XR wrist left 3+ views   Final Result   Newly seen subtle difference in the appearance of the distal radius   potentially due to impaction fracture versus difference in patient   position. Attention recommended on clinical assessment. Depending on   concern CT correlation could be considered.        Additional findings as reported.                  MACRO:   None        Signed by: Charly Siddiqi 12/7/2023 6:18 PM   Dictation workstation:   [id number]       XR hip left with pelvis when performed 2 or 3 views   Final Result   Acute left angulated and displaced intertrochanteric fractures with   displacement of the trochanters. Depending on clinical concern CT   correlation could be considered.             MACRO:   None        Signed by: Charly Siddiqi 12/7/2023 6:32 PM   Dictation workstation:   [id number]             Assessment:   Patient is 89 y.o. female who is admitted to hospital for left hip fracture after fall s/p ORIF 12/8. Nephrology  consulted in view of ISAAC     ISAAC on CKD stage IIIb  -Baseline creatinine is 1.3-1.5  -Admit creatinine 1.4  -Creatinine up to 2.38  -ISAAC likely developing some ATN from ongoing hypotension postop.  Also acute urinary retention contributing     Metabolic acidosis due to ISAAC     HFpEF  -Compensated     Acute urinary retention  - status post bladder scan with greater than 600 mL     Anemia  -Hemoglobin stable.     Hypotension  -Holding antihypertensives          Recommendations:   -Continue IV fluids  -Continue to hold valsartan  -No need for dialysis  -Renal panel in a.m.  -UA         Please message me through EPIC chat with any questions or concerns.     Bartolo Beckett MD  12/13/2023  11:27 AM     [facility]    [address], [address]   [place] [address]  Office: [phone]

## 2023-12-13 NOTE — DISCHARGE SUMMARY
Discharge Summary    Lyn Turner    [id number]     12/13/2023 11:46 AM           .      Discharge Diagnosis:      1.  Undifferentiated shock    2.  Acute kidney injury on chronic kidney disease stage IIIb    3.  Atrial fibrillation on chronic anticoagulation    4.  Status post ORIF left hip fracture.    5.  CAD    6.  Hyperlipidemia    7.  History of breast cancer, lymphoma and thyroid CA    9.  Pacemaker in situ    10.  Heart failure with preserved ejection fraction      Problem List Items Addressed This Visit    None         Hospital Course:    89-year-old  female with notable significant past medical history include but limited to atrial fibrillation, heart failure with preserved ejection fraction, status post pacemaker in situ, breast CA status postchemotherapy radiation to left mastectomy, hypertension, hyperlipidemia, coronary artery disease, thyroid CA status post thyroidectomy, anxiety, depression who presented with a mechanical fall and subsequently underwent left hip arthroplasty.  She appears to have experienced clinical decline starting on 12/10/2023.  Apparently was given 250 normal saline bolus.  The patient's nurse Tariq [id number] that patient speech was somewhat altered and a stroke alert was called, she was investigated with CT scan of her head which was found to be negative for CVA.  She displayed further hemodynamic and clinical decline later that evening.  Patient's daughter/POA states that patient is a DO NOT RESUSCITATE comfort care.  She was willing to try initial pressor therapy in the ICU but no intubation.  Patient appears to have responded poorly to vasopressor, with drug allergy and that per his friends the use of Carlos-Synephrine .  Her clinical course continued to decline and patient's family elected for comfort care only.  She was transition to DNR comfort care only status with transition to inpatient hospice.  Formal hospice admission inpatient was done earlier this a.m. and  patient passed away while in hospice peacefully.      Progress note on the date of  discharge.    Lyn Turner [id number]   Service: Internal Medicine / Hospitalist Date of service: 12/13/23                                  Full Code                           Subjective  Lyn Turner is a 89 y.o.  female with a past medical history significant for HTN, HLD, CAD, atrial fibrillation on Coumadin, HFpEF, s/p PPM, breast cancer s/p chemo/radiation/left mastectomy, thyroid cancer s/p thyroidectomy, hypothyroidism, anxiety, depression and GERD who presented 12/7/23 after a mechanical fall.  She states that she was walking from her kitchen into her living room where there is a small step and after she stepped down she was trying to talk to her family member who was behind her and when she turned she fell back landing on her left forearm as well as left hip.  She did not hit her head or lose consciousness.  She states that the step is maybe a 4 inch or less step between the 2 areas. Patient states that she has had prior wrist surgeries and about 2 to 3 years ago had actually broken her wrist on the left side.  Patient states that she has not taken any Coumadin today. Urinalysis was grossly unremarkable for any infectious etiologies. CT lumbar noted a left proximal femoral fracture, no acute abnormalities, degenerative changes.  XR left hip: Acute left angulated/displaced intertrochanteric fracture with displacement of the trochanters.  XR left wrist/forearm: Noted nearly seen subtle difference in appearance of the distal radius potentially due to impaction fracture versus difference in positioning. The ED physician did discuss case with the on-call orthopedic surgeon who had stated the forearm had a likely buckle fracture with intact hardware from prior surgical intervention and that they planned on taking the patient to the OR for surgical repair of the hip.     12/08/23: No acute events overnight. Vitals  stable. Labs largely unchanged. INR 2.1. Hgb 10.1 (11.1). Going to OR for L IMN today     12/9/2023: No acute events overnight. S/p left hip ORIF on 12/8/2023 pod#1. Patient tolerated procedure well and no acute complains of chest pain/shortness of breath/nausea/vomiting. Patient had cognitive abnormalities that she was not aware of the surgery and demanded to go home right away. I ordered ativan for possible agitation. Patient need close family member to comfort.      12/10/2023: Nurse staff reported patient try to climb out of bed. S/p left hip ORIF on 12/8/2023 pod#2. Patient had low BP and increased BUN/creatinine. I give one bolus of 250 ml NS and increase NS 75 ml/hr. Patient had pain of left hip when exertion. Pain improved when rest at bed. Pain meds on board. She tolerated diet well.              Patient seen and examined, lab data and diagnostics reviewed.     12/11.............Alert called by patient's nurse earlier this morning and of perceived change in speech.  The patient's nurse reported that over the weekend patient speech appeared much clear.  Patient was seen and examined by the hospitalist service and on-call a.m. physician.  I also evaluated patient at the bedside.  In light of I perceived change in speech observed by patient's nurse recommend obtaining CT scan of the head.       12/12.....................Patient seen and examined in presence of her daughters.  Patient tranquil.  Comfortable currently DNR comfort care measures only.  Patient family including patient's power of  her daughter wish to continue with comfort care orders of.  Hospice discussed family wish for transition to hospice care, pastoral care service also requested by family.  The patient daughter at the bedside reports that she is also in the medical field she felt that her mother is comfortable with current comfort care measures.  She did report however that she does that her mother had episodes of increasing  secretions earlier today, Chuyul was added.     12/13......................Patient seen family at the bedside, no complaints reported, patient resting peacefully responding well to supportive care.     Review of Systems:   Review of system otherwise negative if not aforementioned above in subjective.        Physical Exam      Constitutional:       Appearance: Patient appeared in no acute cardiopulmonary distress.     Comments: Patient's somnolent but appeared comfortable.  HEENT:      Head: Normocephalic and atraumatic.Trachea midline      Nose:No observed congestion or rhinorrhea. Nasal cannula in place.    Mouth/Throat: Mucous membranes dry, Trachea appeared  midline,tongue dry  Eyes:      Extraocular Movements: Extraocular movements intact.      Pupils: Pupils are equal, round, and reactive to light.      Comments: No scleral icterus or conjunctival injection appreciated.   Cardiovascular:      Rate and Rhythm: Normal rate and regular rhythm. No clicks rubs or gallops, normal S1 and S2.No peripheral stigmata of endocarditis appreciated.     Pulmonary:      Faint crackles bilaterally.  Abdominal:      General: Abdomen soft, nontender, active bowel sounds, no involuntary guarding or rebound tenderness appreciated.     Comments: None   Musculoskeletal:       Patient appeared to have full active range of motion for upper and extremities.Left hip fractureNo pitting edema or cyanosis appreciated.       Lymphadenopathy:      No appreciable palpable lymphadenopathy  Skin:     General: Skin is warm. Left hip surgical site reflected ecchymosis however unchanged over the last 24 hours, without superimposed appreciation of soft tissue/into commentary infectious process appreciated on examination.     Coloration:  No jaundice     Findings: No abnormal appearing skin rashes or lesions that appeared acute noted on unclothed area of the skin.. Chest wall dressing in place to cover cardiac device secondary to patient reported  history of behavioral disturbance and pulling out IVs.  Neurological:      General: No focal sensory or motor deficits appreciated, no meningeal signs or dysmetria noted. ? Prior episodes of dysarthria.     Cranial Nerves: Cranial nerves II to XII appearing grossly intact.Patient appeared to be having difficulty following a stream of thought.     Genitals:  Deferred  Psychiatric:         The patient appears to be displaying normal mood and affect at the time of evaluation.     Labs:               Lab Results   Component Value Date     GLUCOSE 165 (H) 12/10/2023     CALCIUM 7.7 (L) 12/10/2023      12/10/2023     K 4.3 12/10/2023     CO2 20 (L) 12/10/2023      12/10/2023     BUN 43 (H) 12/10/2023     CREATININE 1.86 (H) 12/10/2023                Lab Results   Component Value Date     WBC 15.1 (H) 12/10/2023     HGB 8.2 (L) 12/10/2023     HCT 26.8 (L) 12/10/2023     MCV 95 12/10/2023      12/10/2023                Lab Results   Component Value Date     WBC 14.1 (H) 12/11/2023     HGB 7.4 (L) 12/11/2023     HCT 23.0 (L) 12/11/2023     MCV 92 12/11/2023      12/11/2023      [unfilled]   [unfilled]   No results found for the last 90 days.                  X-rays/ Images     Procedure Component Value Units Date/Time   CT brain attack head wo IV contrast [[id number]] Resulted: 12/11/23 0849   Order Status: No result Updated: 12/11/23 0858   XR chest 1 view [[id number]] Collected: 12/10/23 0338   Order Status: Completed Updated: 12/10/23 0338   Narrative:     Interpreted By:  Finkelstein, Evan,  STUDY:  XR CHEST 1 VIEW;  12/10/2023 3:32 am      INDICATION:  Signs/Symptoms:check foriegn object from pulling out medport.      COMPARISON:  Chest radiograph 06/14/2019      ACCESSION NUMBER(S):  [id number]      ORDERING CLINICIAN:  MIKAELA WEBB      FINDINGS:  Redemonstrated right chest wall port, tip overlying the right atrium.  Redemonstrated left chest wall pacemaker.      CARDIOMEDIASTINAL  SILHOUETTE:  Enlarged cardiac silhouette, similar compared to prior imaging.      LUNGS:  No pulmonary consolidation, pleural effusion or pneumothorax.      ABDOMEN:  No remarkable upper abdominal findings.      BONES:  No acute osseous abnormality. Surgical clips overlie the left chest  wall.       Impression:     No radiographic evidence of acute cardiopulmonary pathology.      MACRO:  None.      Signed by: Evan Finkelstein 12/10/2023 3:37 AM  Dictation workstation:   [id number]   FL less than 1 hour [[id number]] Resulted: 12/08/23 1335   Order Status: Completed Updated: 12/08/23 1335   Narrative:     These images are not reportable by radiology and will not be interpreted  by  Radiologists.   CT lumbar spine wo IV contrast [[id number]] Collected: 12/07/23 1828   Order Status: Completed Updated: 12/07/23 1828   Narrative:     Interpreted By:  Charly Siddiqi,  STUDY:  CT LUMBAR SPINE WO IV CONTRAST  12/7/2023 6:01 pm      INDICATION:  Signs/Symptoms:fall, left forearm/wrist pain, lhip pain, lower back  pain      COMPARISON:  None.  December 2, 2022 CT abdomen and pelvis and same day left hip  radiographs.      ACCESSION NUMBER(S):  [id number]      ORDERING CLINICIAN:  AVA COPELAND      TECHNIQUE:  Axial CT images of the lumbar spine are obtained. Axial, coronal and  sagittal reconstructions are provided for review.      FINDINGS:  Alignment:  Stable mild rightward curvature lumbar spine. No evidence  significant subluxation.      Vertebra and intervertebral disc spaces: There is underlying osseous  demineralization limiting the accuracy of the assessment. Grossly  stable moderate grade L2 compressive deformity. No evident acute  lumbosacral spine fracture or suspicious osseous lesion. Left  proximal femoral fracture noted on same day radiographs not well  delineated.      Grossly stable osseous findings related to multilevel spondylosis and  degenerative disc changes.      Prevertebral/Paraspinal Soft  Tissues: No detected acute hematoma.  Grossly stable pre-existing findings including extensive arterial  vascular calcifications, probable scar or atelectasis at the lung  bases, colonic diverticulosis and probably benign renal cysts.       Impression:     Left proximal femoral fractures noted on same day radiographs not  well delineated.      No acute detected abnormality involving the lumbar spine.      MACRO:  None      Signed by: Charly Siddiqi 12/7/2023 6:26 PM  Dictation workstation:   [id number]   XR forearm left 2 views [[id number]] Collected: 12/07/23 1819   Order Status: Completed Updated: 12/07/23 1819   Narrative:     Interpreted By:  Charly Siddiqi,  STUDY:  XR WRIST LEFT 3+ VIEWS; XR FOREARM LEFT 2 VIEWS; ;  12/7/2023 5:43 pm      INDICATION:  Signs/Symptoms:fall, left forearm/wrist pain, lhip pain, lower back  pain.      COMPARISON:  March 25, 2014 wrist radiographs.      ACCESSION NUMBER(S):  [id number]; [id number]      ORDERING CLINICIAN:  AVA COPELAND      FINDINGS:  2 views of the left forearm and three views of the left wrist were  obtained. There is osseous demineralization which limits the accuracy  of the assessment.      No detected elbow region fracture or significant elbow effusion.      Grossly intact plate screw fixation hardware distal radius. There is  newly seen subtle buckle deformity involving the extra-articular  portion of the distal radius potentially indicating acute impaction  fracture versus chronic changes including findings related to  difference in patient position. Attention recommended on clinical  assessment. Stable seen positive ulnar variance measuring 4 mm. No  gross displaced acute fractures. Polyarticular arthrosis and  chondrocalcinosis. There are scattered arterial vascular  calcifications.       Impression:     Newly seen subtle difference in the appearance of the distal radius  potentially due to impaction fracture versus difference in patient  position.  Attention recommended on clinical assessment. Depending on  concern CT correlation could be considered.      Additional findings as reported.              MACRO:  None      Signed by: Charly Siddiqi 12/7/2023 6:18 PM  Dictation workstation:   [id number]   XR wrist left 3+ views [[id number]] Collected: 12/07/23 1819   Order Status: Completed Updated: 12/07/23 1819   Narrative:     Interpreted By:  Charly Siddiqi,  STUDY:  XR WRIST LEFT 3+ VIEWS; XR FOREARM LEFT 2 VIEWS; ;  12/7/2023 5:43 pm      INDICATION:  Signs/Symptoms:fall, left forearm/wrist pain, lhip pain, lower back  pain.      COMPARISON:  March 25, 2014 wrist radiographs.      ACCESSION NUMBER(S):  [id number]; [id number]      ORDERING CLINICIAN:  AVA COPELAND      FINDINGS:  2 views of the left forearm and three views of the left wrist were  obtained. There is osseous demineralization which limits the accuracy  of the assessment.      No detected elbow region fracture or significant elbow effusion.      Grossly intact plate screw fixation hardware distal radius. There is  newly seen subtle buckle deformity involving the extra-articular  portion of the distal radius potentially indicating acute impaction  fracture versus chronic changes including findings related to  difference in patient position. Attention recommended on clinical  assessment. Stable seen positive ulnar variance measuring 4 mm. No  gross displaced acute fractures. Polyarticular arthrosis and  chondrocalcinosis. There are scattered arterial vascular  calcifications.       Impression:     Newly seen subtle difference in the appearance of the distal radius  potentially due to impaction fracture versus difference in patient  position. Attention recommended on clinical assessment. Depending on  concern CT correlation could be considered.      Additional findings as reported.              MACRO:  None      Signed by: Charly Siddiqi 12/7/2023 6:18 PM  Dictation workstation:    [id number]   XR hip left with pelvis when performed 2 or 3 views [[id number]] Collected: 12/07/23 1834   Order Status: Completed Updated: 12/07/23 1834   Narrative:     Interpreted By:  Charly Siddiqi,  STUDY:  XR HIP LEFT WITH PELVIS WHEN PERFORMED 2 OR 3 VIEWS; ;  12/7/2023  5:43 pm      INDICATION:  Signs/Symptoms:fall, left forearm/wrist pain, lhip pain, lower back  pain.      COMPARISON:  December 2, 2022 CT scan abdomen and pelvis.      ACCESSION NUMBER(S):  [id number]      ORDERING CLINICIAN:  AVA COPELAND      FINDINGS:  Acute displaced and angulated left intertrochanteric fractures  including displacement of the trochanters. There are scattered  degenerative changes and arterial vascular calcifications. No  suspicious osseous lesion.       Impression:     Acute left angulated and displaced intertrochanteric fractures with  displacement of the trochanters. Depending on clinical concern CT  correlation could be considered.          MACRO:  None      Signed by: Charly Siddiqi 12/7/2023 6:32 PM  Dictation workstation:   [id number]                  Medical Problems         Problem List         * (Principal) Fall, initial encounter     Abdominal wall mass     Anxiety state     Overview Signed 3/16/2023  6:19 PM by Madeline Tyson       Note: AG           Atrial fibrillation (CMS/HCC)     Overview Signed 3/16/2023  6:19 PM by Madeline Tyson       Note: fe           Bilateral carotid artery stenosis     Cerumen impaction     CKD (chronic kidney disease) stage 3, GFR 30-59 ml/min (CMS/HCC)     Closed head injury     COVID-19 virus infection     Decreased range of motion of left shoulder     Depression, major, single episode, moderate (CMS/HCC)     Edema leg     HTN (hypertension)     Overview Signed 3/16/2023  6:19 PM by Madeline Tyson       Note: fe           Fall, accidental     Gastric ulcer     GI bleeding     History of breast cancer     History of lymphoma     History of thyroid cancer      Hyperlipidemia     Overview Signed 3/16/2023  6:19 PM by Madeline Tyson       Note: fe           Hypothyroidism     Overview Signed 3/16/2023  6:19 PM by Madeline Tyson       Note: fe           Peripheral arterial disease (CMS/[facility])     Peritonitis (CMS/[facility])     Pruritus     Skin cancer of nose     Traumatic closed displaced fracture of proximal end of humerus with routine healing     Left wrist fracture, closed, initial encounter     GERD (gastroesophageal reflux disease)     CAD (coronary artery disease)     Closed fracture of left hip (CMS/[facility])                  Above medical problems may be reflective of historical medical problems that may have resolved and may not related to acute clinical condition/medical problems.     Clinical impression/plan:        1. Closed fracture of left hip, initial encounter (CMS/[facility])  Case Request Operating Room: Hip Fracture open reduction internal fixation with nail     Case Request Operating Room: Hip Fracture open reduction internal fixation with nail     s/p ORIF, supportive care, may need SNF placement, need casemanagement       2. Fall, initial encounter          3. History of anticoagulant therapy        monitor INR/PTT       4. Primary hypertension        LOw BP due to dehydration, monmitor and adjust meds as needed.       5. Atrial fibrillation, unspecified type (CMS/[facility])        monitor INR and adjust coumadin as indicated       6. Hypothyroidism, unspecified type        continue synthroid       7. Cognitive disorder        need family memhber for supportive care.       8. ISAAC (acute kidney injury) (CMS/[facility])         9.  Perceive new speech deficit     10..Undifferentiated shock     Additional care plan/disposition: 12/11/2023     Concerning recent reported perceived change in speech will obtain stat CT scan of the head.  Stroke alert protocol.     Worsening acute kidney injury, avoid nephrotoxins, escalate IV fluid hydration.  Hold BP meds     Will discuss discharge  disposition with family and case management.     CBC, BMP, check PT/INR, continue telemetry monitoring.     Will also check UA have to be mindful of urinary tract infection.Consider pancultures.     If worsening renal function will consult nephrology, continue to monitor off nephrotoxins, bladder scan.     Lung bases diminished will monitor closely throughout the day, if UA negative check chest radiograph have to be mindful of aspiration risk.     N.p.o.     Speech evaluation     Hypoglycemic protocol  Further recommendations forthcoming in accordance with patient's clinical disposition and response to care.      Additional care plan/disposition: 12/12/2023   Patient with undifferentiated shock, marked clinical decline, poor response to pressor th will erapy/vasopressor therapy, fluid resuscitation. .Patient be transition to hospice care, hospice consulted.  The patient's family including multiple daughters as well as patient's daughter is power of  wish to honor patient's wishes for no intubation and a DNR comfort care status.  Patient appears comfortable at this present time on comfort care measures.  Hospice service will be consulted.  Will continue comfort care measures.  Options of care discussed in great detail with patient's POA patient's POA elected for comfort care measures and consult to hospice.  Discharge planning:To be determined.     12/13................Disposition/ plan     Patient will be transition to inpatient hospice today hospice meeting planned for this am.      Further comfort care recommendations forthcoming in accordance with patient's clinical disposition and response to care.        Dictation performed with assistance of voice recognition device therefore transcription errors are possible.      Consultants    Hospice, Nephrology           Surgeries/Procedures:    None               Your medication list        ASK your doctor about these medications        Instructions Last Dose  Given Next Dose Due   acetaminophen 500 mg tablet  Commonly known as: Tylenol           amitriptyline 10 mg tablet  Commonly known as: Elavil           anastrozole 1 mg tablet  Commonly known as: Arimidex           coenzyme Q-10 300 mg capsule capsule           cyanocobalamin (vitamin B-12) 1,000 mcg tablet extended release  Commonly known as: Vitamin B-12           levothyroxine 100 mcg tablet  Commonly known as: Synthroid, Levoxyl           metoprolol tartrate 50 mg tablet  Commonly known as: Lopressor           mirtazapine 15 mg tablet  Commonly known as: Remeron           ondansetron ODT 4 mg disintegrating tablet  Commonly known as: Zofran-ODT           pantoprazole 40 mg EC tablet  Commonly known as: ProtoNix           potassium chloride CR 20 mEq ER tablet  Commonly known as: Klor-Con M20           telmisartan 80 mg tablet  Commonly known as: MIcarDIS           warfarin 4 mg tablet  Commonly known as: Coumadin           warfarin 2 mg tablet  Commonly known as: Coumadin                       Disposition;    Patient passed away in hospice care today patient was transition to inpatient hospice today and passed away peacefully, she was pronounced dead at 11:53 AM    Follow-up:    Not applicable      Discharge Time : 31 mins       Condolences: Condolences to the family of  concerning your loss.

## 2023-12-13 NOTE — NURSING NOTE
RN Hospice Note    Lyn Turner is a Hospice Patient.   Hospice terminal diagnosis: Atherosclerotic heart disease  Physician: Slade  Visit type: Initial Visit: GIP Admission    Comments/recommendations: Pt seen and assessed.  Met with family at bedside including 4 of 6 of patient's children and reported POA Anna Ashraf.  All present in agreement with hospice POC and deferred to Anna to sign consents.  Pt to be admitted GIP, not stable to be discharged.  If pt still lingering tomorrow [facility] RN to assess needs.  Will keep medications at [facility] at this time.    Discharge Planning:  Patient to be discharged to  TBD    The following is to be completed:  Discharge order: TBD  State DNR signed by MD: [facility] referral/transfer form: TBD  Medication reconciliation: TBD  PAS/RR or convalescent stay form: TBD  Prescriptions for al narcotics/new medications: TBD  Transportation: TBD  Other: TBD    Plan of care reviewed with patient/family members Anna Ashraf, daughter   Plan of care reviewed with hospital staff members: Genoveva Knott RN / Luly RODRIGUEZ     Please notify [facility] of any changes in condition. Thank you.  Office: [phone] (8 am-6:30 pm M-F and 8 am-4:30 pm weekends and holidays)   [phone] (6:30 pm-8 am M-F and 4:30 pm-8 am weekends and holidays)    Anna Wilson RN

## 2023-12-16 LAB
BACTERIA BLD CULT: NORMAL
BACTERIA BLD CULT: NORMAL

## 2024-01-03 ENCOUNTER — APPOINTMENT (OUTPATIENT)
Dept: ORTHOPEDIC SURGERY | Facility: CLINIC | Age: 89
End: 2024-01-03
Payer: MEDICARE

## 2024-06-27 ENCOUNTER — APPOINTMENT (OUTPATIENT)
Dept: PRIMARY CARE | Facility: CLINIC | Age: 89
End: 2024-06-27
Payer: MEDICARE

## (undated) DEVICE — DRILL BIT, STEPPED, 6MM/9MM CANNULATED

## (undated) DEVICE — DRILL BIT, 4.2MM 3-FLUTED QC 330MM 100MM CALB

## (undated) DEVICE — DRAPE COVER, C ARM, FLOUROSCAN IMAGING SYS

## (undated) DEVICE — IMPLANTABLE DEVICE: Type: IMPLANTABLE DEVICE | Site: HIP | Status: NON-FUNCTIONAL

## (undated) DEVICE — APPLICATOR, CHLORAPREP, W/ORANGE TINT, 26ML

## (undated) DEVICE — GUIDEWIRE, 3.2 X 400

## (undated) DEVICE — TOWEL PACK, STERILE, 4/PACK, BLUE

## (undated) DEVICE — DRESSING, AQUACEL AG, HYDROFIBER W/SILVER, 3.5 X 6 IN

## (undated) DEVICE — GLOVE, PROTEXIS PI CLASSIC, SZ-8.0, PF, PF, LF

## (undated) DEVICE — COVER HANDLE LIGHT, STERIS, BLUE, STERILE

## (undated) DEVICE — SOLUTION, IRRIGATION, SODIUM CHLORIDE 0.9%, 1000 ML, POUR BOTTLE

## (undated) DEVICE — SUTURE, VICRYL, 0, 36 IN, CT-1, UNDYED

## (undated) DEVICE — SYRINGE, 20 CC, LUER LOCK